# Patient Record
Sex: MALE | Race: WHITE | Employment: UNEMPLOYED | URBAN - METROPOLITAN AREA
[De-identification: names, ages, dates, MRNs, and addresses within clinical notes are randomized per-mention and may not be internally consistent; named-entity substitution may affect disease eponyms.]

---

## 2018-03-26 ENCOUNTER — OFFICE VISIT (OUTPATIENT)
Dept: FAMILY MEDICINE | Facility: CLINIC | Age: 53
End: 2018-03-26
Payer: MEDICARE

## 2018-03-26 VITALS
BODY MASS INDEX: 20.7 KG/M2 | TEMPERATURE: 98.3 F | DIASTOLIC BLOOD PRESSURE: 74 MMHG | HEART RATE: 96 BPM | WEIGHT: 116.8 LBS | HEIGHT: 63 IN | SYSTOLIC BLOOD PRESSURE: 112 MMHG | OXYGEN SATURATION: 94 % | RESPIRATION RATE: 18 BRPM

## 2018-03-26 DIAGNOSIS — Z12.11 COLON CANCER SCREENING: ICD-10-CM

## 2018-03-26 DIAGNOSIS — E03.9 HYPOTHYROIDISM, UNSPECIFIED TYPE: ICD-10-CM

## 2018-03-26 DIAGNOSIS — Z11.59 NEED FOR HEPATITIS C SCREENING TEST: ICD-10-CM

## 2018-03-26 DIAGNOSIS — E11.9 TYPE 2 DIABETES MELLITUS WITHOUT COMPLICATION, WITHOUT LONG-TERM CURRENT USE OF INSULIN (H): Primary | ICD-10-CM

## 2018-03-26 LAB
ALBUMIN SERPL-MCNC: 3.6 G/DL (ref 3.4–5)
ALP SERPL-CCNC: 68 U/L (ref 40–150)
ALT SERPL W P-5'-P-CCNC: 148 U/L (ref 0–70)
ANION GAP SERPL CALCULATED.3IONS-SCNC: 14 MMOL/L (ref 3–14)
AST SERPL W P-5'-P-CCNC: 67 U/L (ref 0–45)
BILIRUB SERPL-MCNC: 0.6 MG/DL (ref 0.2–1.3)
BUN SERPL-MCNC: 10 MG/DL (ref 7–30)
CALCIUM SERPL-MCNC: 9.3 MG/DL (ref 8.5–10.1)
CHLORIDE SERPL-SCNC: 95 MMOL/L (ref 94–109)
CHOLEST SERPL-MCNC: 298 MG/DL
CO2 SERPL-SCNC: 21 MMOL/L (ref 20–32)
CREAT SERPL-MCNC: 0.62 MG/DL (ref 0.66–1.25)
GFR SERPL CREATININE-BSD FRML MDRD: >90 ML/MIN/1.7M2
GLUCOSE SERPL-MCNC: 395 MG/DL (ref 70–99)
HBA1C MFR BLD: 14.4 % (ref 4.3–6)
HCV AB SERPL QL IA: NONREACTIVE
HDLC SERPL-MCNC: 61 MG/DL
LDLC SERPL CALC-MCNC: 190 MG/DL
NONHDLC SERPL-MCNC: 237 MG/DL
POTASSIUM SERPL-SCNC: 4.3 MMOL/L (ref 3.4–5.3)
PROT SERPL-MCNC: 6.9 G/DL (ref 6.8–8.8)
SODIUM SERPL-SCNC: 130 MMOL/L (ref 133–144)
T4 FREE SERPL-MCNC: 0.52 NG/DL (ref 0.76–1.46)
TRIGL SERPL-MCNC: 236 MG/DL
TSH SERPL DL<=0.005 MIU/L-ACNC: 91.17 MU/L (ref 0.4–4)

## 2018-03-26 PROCEDURE — 84439 ASSAY OF FREE THYROXINE: CPT | Performed by: FAMILY MEDICINE

## 2018-03-26 PROCEDURE — 83036 HEMOGLOBIN GLYCOSYLATED A1C: CPT | Performed by: FAMILY MEDICINE

## 2018-03-26 PROCEDURE — 99204 OFFICE O/P NEW MOD 45 MIN: CPT | Performed by: FAMILY MEDICINE

## 2018-03-26 PROCEDURE — G0472 HEP C SCREEN HIGH RISK/OTHER: HCPCS | Performed by: FAMILY MEDICINE

## 2018-03-26 PROCEDURE — 80053 COMPREHEN METABOLIC PANEL: CPT | Performed by: FAMILY MEDICINE

## 2018-03-26 PROCEDURE — 80061 LIPID PANEL: CPT | Performed by: FAMILY MEDICINE

## 2018-03-26 PROCEDURE — 36415 COLL VENOUS BLD VENIPUNCTURE: CPT | Performed by: FAMILY MEDICINE

## 2018-03-26 PROCEDURE — 99207 C FOOT EXAM  NO CHARGE: CPT | Performed by: FAMILY MEDICINE

## 2018-03-26 PROCEDURE — 84443 ASSAY THYROID STIM HORMONE: CPT | Performed by: FAMILY MEDICINE

## 2018-03-26 RX ORDER — GLIPIZIDE 10 MG/1
TABLET, FILM COATED, EXTENDED RELEASE ORAL
Refills: 0 | COMMUNITY
Start: 2017-10-09 | End: 2018-03-26

## 2018-03-26 RX ORDER — GABAPENTIN 400 MG/1
CAPSULE ORAL
Refills: 5 | COMMUNITY
Start: 2018-02-12

## 2018-03-26 RX ORDER — LEVOTHYROXINE SODIUM 200 UG/1
200 TABLET ORAL DAILY
Qty: 90 TABLET | Refills: 3 | Status: SHIPPED | OUTPATIENT
Start: 2018-03-26 | End: 2019-03-18 | Stop reason: DRUGHIGH

## 2018-03-26 RX ORDER — ARIPIPRAZOLE 10 MG/1
10 TABLET ORAL DAILY
Refills: 1 | COMMUNITY
Start: 2018-02-12

## 2018-03-26 RX ORDER — GLIPIZIDE 10 MG/1
10 TABLET, FILM COATED, EXTENDED RELEASE ORAL DAILY
Qty: 90 TABLET | Refills: 3 | Status: SHIPPED | OUTPATIENT
Start: 2018-03-26 | End: 2018-08-21

## 2018-03-26 NOTE — PROGRESS NOTES
"  SUBJECTIVE:   Corey Hooper is a 52 year old male who presents to clinic today for the following health issues:        Diabetes       Patient is checking blood sugars: rarely.  Results range from 210 to    Diabetic concerns: None and blood sugar frequently over 200     Symptoms of hypoglycemia (low blood sugar): none     Paresthesias (numbness or burning in feet) or sores: No     Date of last diabetic eye exam: 3 months     BP Readings from Last 2 Encounters:   No data found for BP     No results found for: A1C, LDL    Amount of exercise or physical activity: 2-3 days/week for an average of 30-45 minutes    Problems taking medications regularly: No    Medication side effects: none    Diet: regular (no restrictions) and diabetic      He sees a psychiatrist that prescribes levothyroxine. Had been seeing Dr Jaeger at [place].    Hasn't seen Dr Jaeger in about a month or so.    Blood sugars are in the 200's.    He takes metformin and glipizide but ran out of it a couple of weeks ago.    Jogs 2 times a week. Works 5 nights a week.    Mother present at appointment with him    Lives alone.      ROS  No blurred vision  No headache  No nausea  Appetite is OK  Getting over the flu, minimal cough  No urinary frequency  Not excessively hungry or thirsty, mom does note he drinks a lot of fluids - but he doesn't agree.  No fever  No numbness of feet  No depression    EXAM:  /74  Pulse 96  Temp 98.3  F (36.8  C) (Oral)  Resp 18  Ht 5' 2.5\" (1.588 m)  Wt 116 lb 12.8 oz (53 kg)  SpO2 94%  BMI 21.02 kg/m2  Constitutional: Healthy, alert, no distress   Cardiovascular: RRR. No murmurs   Respiratory: Clear to auscultation   Gastrointestinal: Bowel sounds active, no masses, rebound, guarding or organomegally   Diabetes foot exam: 5 point monofilament foot exam for sensation intact in both feet.   Skin: no rash appreciated on feet or face or arms  Psych: mood fair, a bit withdrawn.    ASSESSMENT    ICD-10-CM    1. Type 2 " diabetes mellitus without complication, without long-term current use of insulin (H) E11.9 glipiZIDE (GLUCOTROL XL) 10 MG 24 hr tablet     metFORMIN (GLUCOPHAGE) 500 MG tablet     Lipid panel reflex to direct LDL Fasting     Hemoglobin A1c     Comprehensive metabolic panel (BMP + Alb, Alk Phos, ALT, AST, Total. Bili, TP)     FOOT EXAM     T4 free   2. Hypothyroidism, unspecified type E03.9 levothyroxine (SYNTHROID/LEVOTHROID) 200 MCG tablet     TSH with free T4 reflex   3. Need for hepatitis C screening test Z11.59 Hepatitis C antibody   4. Colon cancer screening Z12.11 Fecal colorectal cancer screen (FIT)      Plan:  Patient Instructions   Follow-up in 3 months for diabetes.  Call if you need supplies for testing BG.  Contact me sooner if you have questions.     Addendum: based on today' BG result - patient should retest his BG once daily after restarting his diabetes medications.    Should bring in meter within 1-2 weeks to review the results.    Giles Esparza MD  Family Medicine Physician

## 2018-03-26 NOTE — LETTER
March 27, 2018      Corey Hooper  [address] [place] [id number]        Dear ,    We are writing to inform you of your test results.    I am writing to share your lab results with you from your recent visit in our office. They indicate you have significant room for improvement.    You should check your blood sugar daily and schedule a visit to see me in 1-2 weeks to go over your results after restarting your medications.      Please find the results below and let me know if you have any questions.    Resulted Orders   Lipid panel reflex to direct LDL Fasting   Result Value Ref Range    Cholesterol 298 (H) <200 mg/dL      Comment:      Desirable:       <200 mg/dl    Triglycerides 236 (H) <150 mg/dL      Comment:      Borderline high:  150-199 mg/dl  High:             200-499 mg/dl  Very high:       >499 mg/dl  Non Fasting      HDL Cholesterol 61 >39 mg/dL    LDL Cholesterol Calculated 190 (H) <100 mg/dL      Comment:      Above desirable:  100-129 mg/dl  Borderline High:  130-159 mg/dL  High:             160-189 mg/dL  Very high:       >189 mg/dl      Non HDL Cholesterol 237 (H) <130 mg/dL      Comment:      Above Desirable:  130-159 mg/dl  Borderline high:  160-189 mg/dl  High:             190-219 mg/dl  Very high:       >219 mg/dl     Hemoglobin A1c   Result Value Ref Range    Hemoglobin A1C 14.4 (H) 4.3 - 6.0 %      Comment:      Results confirmed by repeat test   TSH with free T4 reflex   Result Value Ref Range    TSH 91.17 (H) 0.40 - 4.00 mU/L      Comment:      Results confirmed by repeat test   Comprehensive metabolic panel (BMP + Alb, Alk Phos, ALT, AST, Total. Bili, TP)   Result Value Ref Range    Sodium 130 (L) 133 - 144 mmol/L    Potassium 4.3 3.4 - 5.3 mmol/L    Chloride 95 94 - 109 mmol/L    Carbon Dioxide 21 20 - 32 mmol/L    Anion Gap 14 3 - 14 mmol/L    Glucose 395 (H) 70 - 99 mg/dL      Comment:      Non Fasting    Urea Nitrogen 10 7 - 30 mg/dL    Creatinine 0.62 (L) 0.66  - 1.25 mg/dL    GFR Estimate >90 >60 mL/min/1.7m2      Comment:      Non  GFR Calc    GFR Estimate If Black >90 >60 mL/min/1.7m2      Comment:       GFR Calc    Calcium 9.3 8.5 - 10.1 mg/dL    Bilirubin Total 0.6 0.2 - 1.3 mg/dL    Albumin 3.6 3.4 - 5.0 g/dL    Protein Total 6.9 6.8 - 8.8 g/dL    Alkaline Phosphatase 68 40 - 150 U/L     (H) 0 - 70 U/L    AST 67 (H) 0 - 45 U/L   T4 free   Result Value Ref Range    T4 Free 0.52 (L) 0.76 - 1.46 ng/dL     If you have any questions or concerns, please call the clinic at the number listed above.     Sincerely,  Giles Esparza MD/nr

## 2018-03-26 NOTE — MR AVS SNAPSHOT
After Visit Summary   3/26/2018    Corey Hooper    MRN: [id number]           Patient Information     Date Of Birth          1965        Visit Information        Provider Department      3/26/2018 11:45 AM Giles Hopkins MD [facility]        Today's Diagnoses     Type 2 diabetes mellitus without complication, without long-term current use of insulin (H)    -  1    Hypothyroidism, unspecified type        Need for hepatitis C screening test        Colon cancer screening          Care Instructions    Follow-up in 3 months for diabetes.  Call if you need supplies for testing BG.  Contact me sooner if you have questions.          Follow-ups after your visit        Follow-up notes from your care team     Return in about 3 months (around 6/26/2018).      Your next 10 appointments already scheduled     Jun 25, 2018 11:00 AM CDT   Office Visit with Giles Esparza MD   [facility] ([facility])    [address] [place] [id number] [phone]           Bring a current list of meds and any records pertaining to this visit. For Physicals, please bring immunization records and any forms needing to be filled out. Please arrive 10 minutes early to complete paperwork.              Future tests that were ordered for you today     Open Future Orders        Priority Expected Expires Ordered    Fecal colorectal cancer screen (FIT) Routine 4/16/2018 6/18/2018 3/26/2018            Who to contact     If you have questions or need follow up information about today's clinic visit or your schedule please contact [facility] directly at [phone].  Normal or non-critical lab and imaging results will be communicated to you by MyChart, letter or phone within 4 business days after the clinic has received the results. If you do not hear from us within 7 days, please contact the clinic through "[employer]t or  "phone. If you have a critical or abnormal lab result, we will notify you by phone as soon as possible.  Submit refill requests through [employer] or call your pharmacy and they will forward the refill request to us. Please allow 3 business days for your refill to be completed.          Additional Information About Your Visit        [employer] Information     [employer] lets you send messages to your doctor, view your test results, renew your prescriptions, schedule appointments and more. To sign up, go to [email or web address] . Click on \"Log in\" on the left side of the screen, which will take you to the Welcome page. Then click on \"Sign up Now\" on the right side of the page.     You will be asked to enter the access code listed below, as well as some personal information. Please follow the directions to create your username and password.     Your access code is: [email or web address]  Expires: 2018  4:55 PM     Your access code will  in 90 days. If you need help or a new code, please call your [facility] clinic or [phone].        Care EveryWhere ID     This is your Care EveryWhere ID. This could be used by other organizations to access your [facility] medical records  [id number]        Your Vitals Were     Pulse Temperature Respirations Height Pulse Oximetry BMI (Body Mass Index)    96 98.3  F (36.8  C) (Oral) 18 5' 2.5\" (1.588 m) 94% 21.02 kg/m2       Blood Pressure from Last 3 Encounters:   18 112/74    Weight from Last 3 Encounters:   18 116 lb 12.8 oz (53 kg)              We Performed the Following     Comprehensive metabolic panel (BMP + Alb, Alk Phos, ALT, AST, Total. Bili, TP)     FOOT EXAM     Hemoglobin A1c     Hepatitis C antibody     Lipid panel reflex to direct LDL Fasting     T4 free     TSH with free T4 reflex          Today's Medication Changes          These changes are accurate as of 3/26/18  4:55 PM.  If you have any questions, ask your nurse or doctor.               These medicines have " changed or have updated prescriptions.        Dose/Directions    glipiZIDE 10 MG 24 hr tablet   Commonly known as:  GLUCOTROL XL   This may have changed:  See the new instructions.   Used for:  Type 2 diabetes mellitus without complication, without long-term current use of insulin (H)   Changed by:  Giles Hopkins MD        Dose:  10 mg   Take 1 tablet (10 mg) by mouth daily   Quantity:  90 tablet   Refills:  3       * LEVOTHYROXINE SODIUM PO   This may have changed:  Another medication with the same name was added. Make sure you understand how and when to take each.   Changed by:  Giles Hopkins MD        Refills:  0       * levothyroxine 200 MCG tablet   Commonly known as:  SYNTHROID/LEVOTHROID   This may have changed:  You were already taking a medication with the same name, and this prescription was added. Make sure you understand how and when to take each.   Used for:  Hypothyroidism, unspecified type   Changed by:  Giles Hopkins MD        Dose:  200 mcg   Take 1 tablet (200 mcg) by mouth daily   Quantity:  90 tablet   Refills:  3       metFORMIN 500 MG tablet   Commonly known as:  GLUCOPHAGE   This may have changed:  See the new instructions.   Used for:  Type 2 diabetes mellitus without complication, without long-term current use of insulin (H)   Changed by:  Giles Hopkins MD        Dose:  500 mg   Take 1 tablet (500 mg) by mouth 2 times daily (with meals)   Quantity:  180 tablet   Refills:  3       * Notice:  This list has 2 medication(s) that are the same as other medications prescribed for you. Read the directions carefully, and ask your doctor or other care provider to review them with you.         Where to get your medicines      These medications were sent to [facility] - [place], [place] - [address]  [address], [place] [address]     Phone:  [phone]     glipiZIDE 10 MG 24 hr tablet    levothyroxine 200  MCG tablet    metFORMIN 500 MG tablet                Primary Care Provider Fax #    Physician No Ref-Primary [phone]       No address on file        Equal Access to Services     TANI GEE : Hadii aad ku hadkira Keita, cora fernandez, svitlana gilbert, giovany lawrence So [facility] [phone].    ATENCIÓN: Si habla español, tiene a valladares disposición servicios gratuitos de asistencia lingüística. Llame al [phone].    We comply with applicable federal civil rights laws and [place] laws. We do not discriminate on the basis of race, color, national origin, age, disability, sex, sexual orientation, or gender identity.            Thank you!     Thank you for choosing [facility]  for your care. Our goal is always to provide you with excellent care. Hearing back from our patients is one way we can continue to improve our services. Please take a few minutes to complete the written survey that you may receive in the mail after your visit with us. Thank you!             Your Updated Medication List - Protect others around you: Learn how to safely use, store and throw away your medicines at www.disposemymeds.org.          This list is accurate as of 3/26/18  4:55 PM.  Always use your most recent med list.                   Brand Name Dispense Instructions for use Diagnosis    ARIPiprazole 10 MG tablet    ABILIFY          gabapentin 400 MG capsule    NEURONTIN     TK 2 CS PO D        glipiZIDE 10 MG 24 hr tablet    GLUCOTROL XL    90 tablet    Take 1 tablet (10 mg) by mouth daily    Type 2 diabetes mellitus without complication, without long-term current use of insulin (H)       * LEVOTHYROXINE SODIUM PO           * levothyroxine 200 MCG tablet    SYNTHROID/LEVOTHROID    90 tablet    Take 1 tablet (200 mcg) by mouth daily    Hypothyroidism, unspecified type       metFORMIN 500 MG tablet    GLUCOPHAGE    180 tablet    Take 1 tablet (500 mg) by mouth 2 times daily  (with meals)    Type 2 diabetes mellitus without complication, without long-term current use of insulin (H)       * Notice:  This list has 2 medication(s) that are the same as other medications prescribed for you. Read the directions carefully, and ask your doctor or other care provider to review them with you.

## 2018-03-26 NOTE — PATIENT INSTRUCTIONS
Follow-up in 3 months for diabetes.  Call if you need supplies for testing BG.  Contact me sooner if you have questions.

## 2018-05-05 PROCEDURE — 82274 ASSAY TEST FOR BLOOD FECAL: CPT | Performed by: FAMILY MEDICINE

## 2018-05-08 DIAGNOSIS — Z12.11 COLON CANCER SCREENING: ICD-10-CM

## 2018-05-08 LAB — HEMOCCULT STL QL IA: NEGATIVE

## 2018-06-25 ENCOUNTER — OFFICE VISIT (OUTPATIENT)
Dept: FAMILY MEDICINE | Facility: CLINIC | Age: 53
End: 2018-06-25
Payer: MEDICARE

## 2018-06-25 VITALS
BODY MASS INDEX: 21.71 KG/M2 | DIASTOLIC BLOOD PRESSURE: 79 MMHG | HEART RATE: 99 BPM | RESPIRATION RATE: 16 BRPM | TEMPERATURE: 97.4 F | SYSTOLIC BLOOD PRESSURE: 114 MMHG | WEIGHT: 120.6 LBS

## 2018-06-25 DIAGNOSIS — E11.9 TYPE 2 DIABETES MELLITUS WITHOUT COMPLICATION, WITHOUT LONG-TERM CURRENT USE OF INSULIN (H): Primary | ICD-10-CM

## 2018-06-25 DIAGNOSIS — E03.9 HYPOTHYROIDISM, UNSPECIFIED TYPE: ICD-10-CM

## 2018-06-25 LAB
ALBUMIN SERPL-MCNC: 4.2 G/DL (ref 3.4–5)
ALP SERPL-CCNC: 38 U/L (ref 40–150)
ALT SERPL W P-5'-P-CCNC: 35 U/L (ref 0–70)
ANION GAP SERPL CALCULATED.3IONS-SCNC: 10 MMOL/L (ref 3–14)
AST SERPL W P-5'-P-CCNC: 21 U/L (ref 0–45)
BILIRUB SERPL-MCNC: 0.8 MG/DL (ref 0.2–1.3)
BUN SERPL-MCNC: 14 MG/DL (ref 7–30)
CALCIUM SERPL-MCNC: 9.4 MG/DL (ref 8.5–10.1)
CHLORIDE SERPL-SCNC: 97 MMOL/L (ref 94–109)
CHOLEST SERPL-MCNC: 240 MG/DL
CO2 SERPL-SCNC: 26 MMOL/L (ref 20–32)
CREAT SERPL-MCNC: 0.65 MG/DL (ref 0.66–1.25)
GFR SERPL CREATININE-BSD FRML MDRD: >90 ML/MIN/1.7M2
GLUCOSE SERPL-MCNC: 325 MG/DL (ref 70–99)
HBA1C MFR BLD: 12.7 % (ref 0–5.6)
HDLC SERPL-MCNC: 103 MG/DL
LDLC SERPL CALC-MCNC: 127 MG/DL
NONHDLC SERPL-MCNC: 137 MG/DL
POTASSIUM SERPL-SCNC: 4.5 MMOL/L (ref 3.4–5.3)
PROT SERPL-MCNC: 7.4 G/DL (ref 6.8–8.8)
SODIUM SERPL-SCNC: 133 MMOL/L (ref 133–144)
TRIGL SERPL-MCNC: 50 MG/DL
TSH SERPL DL<=0.005 MIU/L-ACNC: 0.91 MU/L (ref 0.4–4)

## 2018-06-25 PROCEDURE — 36415 COLL VENOUS BLD VENIPUNCTURE: CPT | Performed by: FAMILY MEDICINE

## 2018-06-25 PROCEDURE — 80053 COMPREHEN METABOLIC PANEL: CPT | Performed by: FAMILY MEDICINE

## 2018-06-25 PROCEDURE — 83036 HEMOGLOBIN GLYCOSYLATED A1C: CPT | Performed by: FAMILY MEDICINE

## 2018-06-25 PROCEDURE — 84443 ASSAY THYROID STIM HORMONE: CPT | Performed by: FAMILY MEDICINE

## 2018-06-25 PROCEDURE — 80061 LIPID PANEL: CPT | Performed by: FAMILY MEDICINE

## 2018-06-25 PROCEDURE — 99214 OFFICE O/P EST MOD 30 MIN: CPT | Performed by: FAMILY MEDICINE

## 2018-06-25 RX ORDER — GLIPIZIDE 10 MG/1
20 TABLET, FILM COATED, EXTENDED RELEASE ORAL DAILY
Qty: 180 TABLET | Refills: 1 | Status: SHIPPED | OUTPATIENT
Start: 2018-06-25 | End: 2018-10-05

## 2018-06-25 RX ORDER — METFORMIN HCL 500 MG
1000 TABLET, EXTENDED RELEASE 24 HR ORAL 2 TIMES DAILY WITH MEALS
Qty: 360 TABLET | Refills: 1 | Status: SHIPPED | OUTPATIENT
Start: 2018-06-25 | End: 2018-10-05

## 2018-06-25 NOTE — MR AVS SNAPSHOT
After Visit Summary   6/25/2018    Corey Hooper    MRN: [id number]           Patient Information     Date Of Birth          1965        Visit Information        Provider Department      6/25/2018 11:00 AM Giles Hopkins MD [facility]        Today's Diagnoses     Type 2 diabetes mellitus without complication, without long-term current use of insulin (H)    -  1      Care Instructions    Follow-up in 3 months for lab recheck.    Increase metformin to 1,000mg twice daily and glipizide to 20mg daily.    Will check liver function and cholesterol tests today.    Will likely start simvastatin 40mg unless LDL is very low (below 40).          Follow-ups after your visit        Who to contact     If you have questions or need follow up information about today's clinic visit or your schedule please contact [facility] directly at [phone].  Normal or non-critical lab and imaging results will be communicated to you by MyChart, letter or phone within 4 business days after the clinic has received the results. If you do not hear from us within 7 days, please contact the clinic through "[employer]hart or phone. If you have a critical or abnormal lab result, we will notify you by phone as soon as possible.  Submit refill requests through [employer] or call your pharmacy and they will forward the refill request to us. Please allow 3 business days for your refill to be completed.          Additional Information About Your Visit        MyChart Information     [employer] gives you secure access to your electronic health record. If you see a primary care provider, you can also send messages to your care team and make appointments. If you have questions, please call your primary care clinic.  If you do not have a primary care provider, please call [phone] and they will assist you.        Care EveryWhere ID     This is your Care EveryWhere ID. This could be used by other  organizations to access your [facility] medical records  [id number]        Your Vitals Were     Pulse Temperature Respirations BMI (Body Mass Index)          99 97.4  F (36.3  C) (Oral) 16 21.71 kg/m2         Blood Pressure from Last 3 Encounters:   06/25/18 114/79   03/26/18 112/74    Weight from Last 3 Encounters:   06/25/18 120 lb 9.6 oz (54.7 kg)   03/26/18 116 lb 12.8 oz (53 kg)              We Performed the Following     Comprehensive metabolic panel (BMP + Alb, Alk Phos, ALT, AST, Total. Bili, TP)     Hemoglobin A1c     Lipid panel reflex to direct LDL Fasting          Today's Medication Changes          These changes are accurate as of 6/25/18 11:29 AM.  If you have any questions, ask your nurse or doctor.               These medicines have changed or have updated prescriptions.        Dose/Directions    * glipiZIDE 10 MG 24 hr tablet   Commonly known as:  GLUCOTROL XL   This may have changed:  Another medication with the same name was added. Make sure you understand how and when to take each.   Used for:  Type 2 diabetes mellitus without complication, without long-term current use of insulin (H)   Changed by:  Giles Hopkins MD        Dose:  10 mg   Take 1 tablet (10 mg) by mouth daily   Quantity:  90 tablet   Refills:  3       * glipiZIDE 10 MG 24 hr tablet   Commonly known as:  glipiZIDE XL   This may have changed:  You were already taking a medication with the same name, and this prescription was added. Make sure you understand how and when to take each.   Used for:  Type 2 diabetes mellitus without complication, without long-term current use of insulin (H)   Changed by:  Giles Hopkins MD        Dose:  20 mg   Take 2 tablets (20 mg) by mouth daily   Quantity:  180 tablet   Refills:  1       * metFORMIN 500 MG tablet   Commonly known as:  GLUCOPHAGE   This may have changed:  Another medication with the same name was added. Make sure you understand how and when to take  each.   Used for:  Type 2 diabetes mellitus without complication, without long-term current use of insulin (H)   Changed by:  Giles Hopkins MD        Dose:  500 mg   Take 1 tablet (500 mg) by mouth 2 times daily (with meals)   Quantity:  180 tablet   Refills:  3       * metFORMIN 500 MG 24 hr tablet   Commonly known as:  GLUCOPHAGE-XR   This may have changed:  You were already taking a medication with the same name, and this prescription was added. Make sure you understand how and when to take each.   Used for:  Type 2 diabetes mellitus without complication, without long-term current use of insulin (H)   Changed by:  Giles Hopkins MD        Dose:  1000 mg   Take 2 tablets (1,000 mg) by mouth 2 times daily (with meals)   Quantity:  360 tablet   Refills:  1       * Notice:  This list has 4 medication(s) that are the same as other medications prescribed for you. Read the directions carefully, and ask your doctor or other care provider to review them with you.         Where to get your medicines      These medications were sent to [facility] - [place], [place] - [address]  [address], [place] [address]     Phone:  [phone]     glipiZIDE 10 MG 24 hr tablet    metFORMIN 500 MG 24 hr tablet                Primary Care Provider Fax #    Physician No Ref-Primary [phone]       No address on file        Equal Access to Services     TANI GEE : Toi crow Soralph, waaxda luqadaha, qaybta kaalmada ofelia, giovany gomez. So [facility] [place] [phone].    ATENCIÓN: Si habla español, tiene a valladares disposición servicios gratuitos de asistencia lingüística. Llame al [phone].    We comply with applicable federal civil rights laws and [place] laws. We do not discriminate on the basis of race, color, national origin, age, disability, sex, sexual orientation, or gender identity.            Thank you!     Thank you for choosing  [facility]  for your care. Our goal is always to provide you with excellent care. Hearing back from our patients is one way we can continue to improve our services. Please take a few minutes to complete the written survey that you may receive in the mail after your visit with us. Thank you!             Your Updated Medication List - Protect others around you: Learn how to safely use, store and throw away your medicines at www.disposemymeds.org.          This list is accurate as of 6/25/18 11:29 AM.  Always use your most recent med list.                   Brand Name Dispense Instructions for use Diagnosis    ARIPiprazole 10 MG tablet    ABILIFY          gabapentin 400 MG capsule    NEURONTIN     TK 2 CS PO D        * glipiZIDE 10 MG 24 hr tablet    GLUCOTROL XL    90 tablet    Take 1 tablet (10 mg) by mouth daily    Type 2 diabetes mellitus without complication, without long-term current use of insulin (H)       * glipiZIDE 10 MG 24 hr tablet    glipiZIDE XL    180 tablet    Take 2 tablets (20 mg) by mouth daily    Type 2 diabetes mellitus without complication, without long-term current use of insulin (H)       * LEVOTHYROXINE SODIUM PO           * levothyroxine 200 MCG tablet    SYNTHROID/LEVOTHROID    90 tablet    Take 1 tablet (200 mcg) by mouth daily    Hypothyroidism, unspecified type       * metFORMIN 500 MG tablet    GLUCOPHAGE    180 tablet    Take 1 tablet (500 mg) by mouth 2 times daily (with meals)    Type 2 diabetes mellitus without complication, without long-term current use of insulin (H)       * metFORMIN 500 MG 24 hr tablet    GLUCOPHAGE-XR    360 tablet    Take 2 tablets (1,000 mg) by mouth 2 times daily (with meals)    Type 2 diabetes mellitus without complication, without long-term current use of insulin (H)       * Notice:  This list has 6 medication(s) that are the same as other medications prescribed for you. Read the directions carefully, and ask your doctor or other care  provider to review them with you.

## 2018-06-25 NOTE — PATIENT INSTRUCTIONS
Follow-up in 3 months for lab recheck.    Increase metformin to 1,000mg twice daily and glipizide to 20mg daily.    Will check liver function and cholesterol tests today.    Will likely start simvastatin 40mg unless LDL is very low (below 40).

## 2018-06-25 NOTE — PROGRESS NOTES
Here to check on diabetes and cholesterol    He checks his BG and gets 210 or a little under.    Taking and tolerating medications for diabetes and thyroid replacement.    ROS  No fever  No dizziness  Does have some constipation  Mild nausea intermittently    EXAM:  /79  Pulse 99  Temp 97.4  F (36.3  C) (Oral)  Resp 16  Wt 120 lb 9.6 oz (54.7 kg)  BMI 21.71 kg/m2  Constitutional: Healthy, alert, no distress   Cardiovascular: RRR. No murmurs   Respiratory: Clear to auscultation     ASSESSMENT    ICD-10-CM    1. Type 2 diabetes mellitus without complication, without long-term current use of insulin (H) E11.9 Hemoglobin A1c     metFORMIN (GLUCOPHAGE-XR) 500 MG 24 hr tablet     glipiZIDE (GLIPIZIDE XL) 10 MG 24 hr tablet     Comprehensive metabolic panel (BMP + Alb, Alk Phos, ALT, AST, Total. Bili, TP)     Lipid panel reflex to direct LDL Fasting     CANCELED: Hemoglobin A1c   2. Hypothyroidism, unspecified type E03.9 TSH with free T4 reflex      Plan:  Patient Instructions   Follow-up in 3 months for lab recheck.    Increase metformin to 1,000mg twice daily and glipizide to 20mg daily.    Will check liver function and cholesterol tests today.    Will likely start simvastatin 40mg unless LDL is very low (below 40).     Diabetes poorly controlled, recommending change to medications and close follow-up  Thyroid replacement appears unsatisfactory by test, may relate to poor diabetes management, will retest.      Giles Esparza MD  Family Medicine Physician

## 2018-07-27 DIAGNOSIS — E78.5 HYPERLIPIDEMIA LDL GOAL <70: Primary | ICD-10-CM

## 2018-07-27 RX ORDER — SIMVASTATIN 40 MG
40 TABLET ORAL AT BEDTIME
Qty: 90 TABLET | Refills: 3 | Status: SHIPPED | OUTPATIENT
Start: 2018-07-27 | End: 2019-03-11

## 2018-07-27 NOTE — TELEPHONE ENCOUNTER
Spoke with patient, encouraged him to start this, but he wants to consider it before doing so. Script sent, if he hasn't started it by next visit can discuss with him at that time.    Will check CMP after starting statin in 3-6 months.    Giles Esparza MD  Family Medicine Physician

## 2018-07-27 NOTE — TELEPHONE ENCOUNTER
Mother Sharon calling and Corey does wants to start the simvastatin, please sign off on med if correct.

## 2018-07-30 ENCOUNTER — TELEPHONE (OUTPATIENT)
Dept: FAMILY MEDICINE | Facility: CLINIC | Age: 53
End: 2018-07-30

## 2018-07-30 NOTE — TELEPHONE ENCOUNTER
Roxie Hunter, mother, stated that she has previously spoken to triage nurse and had his office visit reports ready at clinic  to .  Is now requesting to  all his lab work that was done on 3-26-18 and 6-25-18.  I was going to transfer her to Medical Records and she said the nurse will get them ready for her to .  Please call. OK to leave message on voicemail.  **Consent to communicate with mother is on file. **

## 2018-08-13 ENCOUNTER — TELEPHONE (OUTPATIENT)
Dept: FAMILY MEDICINE | Facility: CLINIC | Age: 53
End: 2018-08-13

## 2018-08-13 DIAGNOSIS — E11.9 TYPE 2 DIABETES MELLITUS WITHOUT COMPLICATION, WITHOUT LONG-TERM CURRENT USE OF INSULIN (H): Primary | ICD-10-CM

## 2018-08-13 NOTE — TELEPHONE ENCOUNTER
Reason for Call: Request for an order or referral:    Order or referral being requested: Nutritionist     Date needed: as soon as possible    Has the patient been seen by the PCP for this problem? YES    Additional comments: Pt is requesting for a referral, states this was discussed during the last OV. Please advise, thank you!    Phone number Patient can be reached at:  Home number on file [phone] (home)    Best Time:  Anytime    Can we leave a detailed message on this number?  YES    Call taken on 8/13/2018 at 1:26 PM by Courtney Taylor

## 2018-08-21 ENCOUNTER — OFFICE VISIT (OUTPATIENT)
Dept: PHARMACY | Facility: CLINIC | Age: 53
End: 2018-08-21
Payer: COMMERCIAL

## 2018-08-21 VITALS
DIASTOLIC BLOOD PRESSURE: 80 MMHG | SYSTOLIC BLOOD PRESSURE: 108 MMHG | HEART RATE: 95 BPM | OXYGEN SATURATION: 95 % | WEIGHT: 114.2 LBS | BODY MASS INDEX: 20.55 KG/M2

## 2018-08-21 DIAGNOSIS — E11.9 TYPE 2 DIABETES MELLITUS WITHOUT COMPLICATION, WITHOUT LONG-TERM CURRENT USE OF INSULIN (H): Primary | ICD-10-CM

## 2018-08-21 DIAGNOSIS — F25.9 SCHIZOPHRENIA, SCHIZO-AFFECTIVE (H): ICD-10-CM

## 2018-08-21 PROCEDURE — 99607 MTMS BY PHARM ADDL 15 MIN: CPT | Mod: GY | Performed by: PHARMACIST

## 2018-08-21 PROCEDURE — 99605 MTMS BY PHARM NP 15 MIN: CPT | Mod: GY | Performed by: PHARMACIST

## 2018-08-21 NOTE — PROGRESS NOTES
SUBJECTIVE/OBJECTIVE:                           Corey Hooper is a 52 year old male coming in for an initial visit for Medication Therapy Management.  He was referred to me from Dr. Duane Monroe.      FYI--Use GY modifier when billing.      Chief Complaint:  Here for help with meds/ trying to gain weight .   He is not interested in insulin but wants food plans.         Personal Healthcare Goals: fix dm w/out insulin, gain 20+ lbs.     Allergies/ADRs: Reviewed in Epic  Tobacco: No tobacco use  Alcohol: not currently using  Caffeine: 1-2 sodas/day, 1-2 coffee's/day .   Activity: belongs to gym - goes 2-3 x month .   PMH: Reviewed in Epic    Medication Adherence/Access:  no issues reported    Diabetes:  Pt currently taking metformin er 500mg --2 tabs bid , glipizide 2 x10mgs qam.. Pt is not experiencing side effects.  SMBG: one time daily, two times daily.   Ranges (patient reported): fasting in am - 200+, 2 hrs post supper - 200+   Patient is not experiencing hypoglycemia  Recent symptoms of high blood sugar? Anxious, depressed, lethargic  Eye exam: due  Foot exam: up to date  ACEi/ARB: No.   Urine Albumin: No results found for: UMALCR   Aspirin: Not taking due to ?   Diet/Exercise: b-fast - frosted flakes and soy milk, snacks -apples, potato chips, lunch - skips --, dinner- eats frozen meals a lot , popcorn, drinks - pop        Schizo-affective disorder: Taking abilify 10mg./day --stable mental health wise --we discussed this drug can increase blood sugars but more important that he keep taking it for mental health help.       Today's Vitals: /80  Pulse 95  Wt 114 lb 3.2 oz (51.8 kg)  SpO2 95%  BMI 20.55 kg/m2      ASSESSMENT:                             Current medications were reviewed today.     Medication Adherence: excellent, no issues identified    Diabetes: Needs Improvement. Patient is not meeting A1c goal of < 7%--FYI--He declines insulin shots today!  . Self monitoring of blood glucose is not  at goal of fasting  mg/dL and post prandial < 150 mg/dL. Pt would benefit from minimum SMBG: Check blood sugars fasting, and occasionally 2 hours after starting a meal.  Metformin :  stay on the same dose.  SFU (glipizide) :  stay on the same dose.  Increased exercise  Weight gain recommended--gave my 1 page food/diet handout --spent majority of visit educating patient that he needs to REDUCE carbs/sugars in diet to store bs and gain weight , also we googled american diabetes websiet for meal plans so he know /has ideas of what foods to cook/eat .   Aspirin therapy is safe and appropriate. Aspirin therapy is indicated in this patient at dose of 81mg daily. Pt is not taking aspirin. He agreed to start this week.     Schizo-affective disorder:  STABLE     PLAN:                            1.  FYI--Please read my 1 page dietary food handout --remember --a lot of your weight loss is due to elevated (high) blood sugars . Reduce your carbohydrates in diet --eat more proteins/halthy fats and non starchy veggies and drink water.   Stay on your glipizide and metformin as is -- check blood sugar 2 x day .  If blood sugars get to low --we will cut back on your glipizide.   If your blood sugars donot improve --we can add insulin -basal injection once daily.     2. American Diabetes association has meal plans for you --google them or diabetes.org.     3. FYI--Please take a daily 81mg aspirin tab .       Next MTM visit:  Try the diet --make appt. To see me if your interested. Bring blood sugar meter if you come to see me.      I spent 50 minutes with this patient today. All changes were made via collaborative practice agreement with Dr. Bautista. A copy of the visit note was provided to the patient's primary care and referring provider.        The patient was given a summary of these recommendations as an after visit summary.     Tomasa Deal Rph.  Medication Therapy Management Provider  [phone]

## 2018-08-21 NOTE — MR AVS SNAPSHOT
After Visit Summary   8/21/2018    Corey Hooper    MRN: [id number]           Patient Information     Date Of Birth          1965        Visit Information        Provider Department      8/21/2018 2:30 PM Tomasa Deal RPH [facility] MTM        Care Instructions    Recommendations from today's MTM visit:                                                    MTM (medication therapy management) is a service provided by a clinical pharmacist designed to help you get the most of out of your medicines.   Today we reviewed what your medicines are for, how to know if they are working, that your medicines are safe and how to make your medicine regimen as easy as possible.     1.  FYI--Please read my 1 page dietary food handout --remember --a lot of your weight loss is due to elevated (high) blood sugars . Reduce your carbohydrates in diet --eat more proteins/halthy fats and non starchy veggies and drink water.   Stay on your glipizide and metformin as is -- check blood sugar 2 x day .  If blood sugars get to low --we will cut back on your glipizide.   If your blood sugars donot improve --we can add insulin -basal injection once daily.     2. American Diabetes association has meal plans for you --google them or diabetes.org.     3. FYI--Please take a daily 81mg aspirin tab .       Next MTM visit:  Try the diet --make appt. To see me if your interested. Bring blood sugar meter if you come to see me.    To schedule another MTM appointment, please call the clinic directly or you may call the MTM scheduling line at [phone] or toll-free at [phone].     My Clinical Pharmacist's contact information:                                                      It was a pleasure seeing you today!  Please feel free to contact me with any questions or concerns you have.      Tomasa Deal Rph.  Medication Therapy Management Provider  [phone]      You may receive a survey about the MTM  services you received.  I would appreciate your feedback to help me serve you better in the future. Please fill it out and return it when you can. Your comments will be anonymous.                    Follow-ups after your visit        Who to contact     If you have questions or need follow up information about today's clinic visit or your schedule please contact [facility] directly at [phone].  Normal or non-critical lab and imaging results will be communicated to you by MYOShart, letter or phone within 4 business days after the clinic has received the results. If you do not hear from us within 7 days, please contact the clinic through [employer] or phone. If you have a critical or abnormal lab result, we will notify you by phone as soon as possible.  Submit refill requests through [employer] or call your pharmacy and they will forward the refill request to us. Please allow 3 business days for your refill to be completed.          Additional Information About Your Visit        MYOShart Information     [employer] gives you secure access to your electronic health record. If you see a primary care provider, you can also send messages to your care team and make appointments. If you have questions, please call your primary care clinic.  If you do not have a primary care provider, please call [phone] and they will assist you.        Care EveryWhere ID     This is your Care EveryWhere ID. This could be used by other organizations to access your [facility] medical records  [id number]        Your Vitals Were     Pulse Pulse Oximetry BMI (Body Mass Index)             95 95% 20.55 kg/m2          Blood Pressure from Last 3 Encounters:   08/21/18 108/80   06/25/18 114/79   03/26/18 112/74    Weight from Last 3 Encounters:   08/21/18 114 lb 3.2 oz (51.8 kg)   06/25/18 120 lb 9.6 oz (54.7 kg)   03/26/18 116 lb 12.8 oz (53 kg)              Today, you had the following     No orders found for display        Primary Care Provider Fax #    Physician No Ref-Primary [phone]       No address on file        Equal Access to Services     TANI GEE : Hadii aad ku hadkira Keita, cora marilynjhoana, svitlana gilbert, giovany chikisin hayaairene echeverrialara barraza laSantosfausto gomez. So [facility] [phone].    ATENCIÓN: Si habla español, tiene a valladares disposición servicios gratuitos de asistencia lingüística. Llame al [phone].    We comply with applicable federal civil rights laws and [place] laws. We do not discriminate on the basis of race, color, national origin, age, disability, sex, sexual orientation, or gender identity.            Thank you!     Thank you for choosing [facility]  for your care. Our goal is always to provide you with excellent care. Hearing back from our patients is one way we can continue to improve our services. Please take a few minutes to complete the written survey that you may receive in the mail after your visit with us. Thank you!             Your Updated Medication List - Protect others around you: Learn how to safely use, store and throw away your medicines at www.disposemymeds.org.          This list is accurate as of 8/21/18  3:14 PM.  Always use your most recent med list.                   Brand Name Dispense Instructions for use Diagnosis    ARIPiprazole 10 MG tablet    ABILIFY     Take 10 mg by mouth daily        gabapentin 400 MG capsule    NEURONTIN     TK 2 CS PO D        glipiZIDE 10 MG 24 hr tablet    glipiZIDE XL    180 tablet    Take 2 tablets (20 mg) by mouth daily    Type 2 diabetes mellitus without complication, without long-term current use of insulin (H)       levothyroxine 200 MCG tablet    SYNTHROID/LEVOTHROID    90 tablet    Take 1 tablet (200 mcg) by mouth daily    Hypothyroidism, unspecified type       metFORMIN 500 MG 24 hr tablet    GLUCOPHAGE-XR    360 tablet    Take 2 tablets (1,000 mg) by mouth 2 times daily (with meals)    Type 2 diabetes mellitus  without complication, without long-term current use of insulin (H)       simvastatin 40 MG tablet    ZOCOR    90 tablet    Take 1 tablet (40 mg) by mouth At Bedtime    Hyperlipidemia LDL goal <70

## 2018-08-21 NOTE — Clinical Note
Sharan. For mtm referral --educated pt. Why he isnt gaining weight --he declined insulin today --will try low carb diet with current oral dm meds first.  Luis.-orestes

## 2018-08-21 NOTE — PATIENT INSTRUCTIONS
Recommendations from today's MTM visit:                                                    MTM (medication therapy management) is a service provided by a clinical pharmacist designed to help you get the most of out of your medicines.   Today we reviewed what your medicines are for, how to know if they are working, that your medicines are safe and how to make your medicine regimen as easy as possible.     1.  FYI--Please read my 1 page dietary food handout --remember --a lot of your weight loss is due to elevated (high) blood sugars . Reduce your carbohydrates in diet --eat more proteins/halthy fats and non starchy veggies and drink water.   Stay on your glipizide and metformin as is -- check blood sugar 2 x day .  If blood sugars get to low --we will cut back on your glipizide.   If your blood sugars donot improve --we can add insulin -basal injection once daily.     2. American Diabetes association has meal plans for you --google them or diabetes.org.     3. FYI--Please take a daily 81mg aspirin tab .       Next MTM visit:  Try the diet --make appt. To see me if your interested. Bring blood sugar meter if you come to see me.    To schedule another MTM appointment, please call the clinic directly or you may call the MTM scheduling line at [phone] or toll-free at [phone].     My Clinical Pharmacist's contact information:                                                      It was a pleasure seeing you today!  Please feel free to contact me with any questions or concerns you have.      Tomasa Deal [facility].  Medication Therapy Management Provider  [phone]      You may receive a survey about the MTM services you received.  I would appreciate your feedback to help me serve you better in the future. Please fill it out and return it when you can. Your comments will be anonymous.

## 2018-10-03 ENCOUNTER — MYC MEDICAL ADVICE (OUTPATIENT)
Dept: FAMILY MEDICINE | Facility: CLINIC | Age: 53
End: 2018-10-03

## 2018-10-05 ENCOUNTER — OFFICE VISIT (OUTPATIENT)
Dept: FAMILY MEDICINE | Facility: CLINIC | Age: 53
End: 2018-10-05
Payer: MEDICARE

## 2018-10-05 VITALS
WEIGHT: 114 LBS | OXYGEN SATURATION: 98 % | HEIGHT: 63 IN | HEART RATE: 68 BPM | TEMPERATURE: 97.9 F | DIASTOLIC BLOOD PRESSURE: 80 MMHG | RESPIRATION RATE: 14 BRPM | SYSTOLIC BLOOD PRESSURE: 132 MMHG | BODY MASS INDEX: 20.2 KG/M2

## 2018-10-05 DIAGNOSIS — E11.9 TYPE 2 DIABETES MELLITUS WITHOUT COMPLICATION, WITHOUT LONG-TERM CURRENT USE OF INSULIN (H): Primary | ICD-10-CM

## 2018-10-05 DIAGNOSIS — E03.9 HYPOTHYROIDISM, UNSPECIFIED TYPE: ICD-10-CM

## 2018-10-05 DIAGNOSIS — Z23 NEED FOR PROPHYLACTIC VACCINATION AND INOCULATION AGAINST INFLUENZA: ICD-10-CM

## 2018-10-05 LAB
HBA1C MFR BLD: 11.8 % (ref 0–5.6)
T4 FREE SERPL-MCNC: 1.52 NG/DL (ref 0.76–1.46)
TSH SERPL DL<=0.005 MIU/L-ACNC: 28.44 MU/L (ref 0.4–4)

## 2018-10-05 PROCEDURE — 84439 ASSAY OF FREE THYROXINE: CPT | Performed by: FAMILY MEDICINE

## 2018-10-05 PROCEDURE — 36415 COLL VENOUS BLD VENIPUNCTURE: CPT | Performed by: FAMILY MEDICINE

## 2018-10-05 PROCEDURE — G0008 ADMIN INFLUENZA VIRUS VAC: HCPCS | Performed by: FAMILY MEDICINE

## 2018-10-05 PROCEDURE — 99214 OFFICE O/P EST MOD 30 MIN: CPT | Mod: 25 | Performed by: FAMILY MEDICINE

## 2018-10-05 PROCEDURE — 84443 ASSAY THYROID STIM HORMONE: CPT | Performed by: FAMILY MEDICINE

## 2018-10-05 PROCEDURE — 83036 HEMOGLOBIN GLYCOSYLATED A1C: CPT | Performed by: FAMILY MEDICINE

## 2018-10-05 PROCEDURE — 90682 RIV4 VACC RECOMBINANT DNA IM: CPT | Performed by: FAMILY MEDICINE

## 2018-10-05 RX ORDER — GLIPIZIDE 10 MG/1
20 TABLET, FILM COATED, EXTENDED RELEASE ORAL DAILY
Qty: 180 TABLET | Refills: 0 | Status: SHIPPED | OUTPATIENT
Start: 2018-10-05 | End: 2019-02-17

## 2018-10-05 RX ORDER — LEVOTHYROXINE SODIUM 175 UG/1
175 TABLET ORAL DAILY
Qty: 30 TABLET | Refills: 1 | Status: SHIPPED | OUTPATIENT
Start: 2018-10-05 | End: 2019-03-08

## 2018-10-05 RX ORDER — METFORMIN HCL 500 MG
1000 TABLET, EXTENDED RELEASE 24 HR ORAL 2 TIMES DAILY WITH MEALS
Qty: 360 TABLET | Refills: 0 | Status: SHIPPED | OUTPATIENT
Start: 2018-10-05 | End: 2019-05-03

## 2018-10-05 NOTE — NURSING NOTE
Prior to injection verified patient identity using patient's name and date of birth.  Due to injection administration, patient instructed to remain in clinic for 15 minutes  afterwards, and to report any adverse reaction to me immediately. Vaccine information supplied.      Injectable Influenza Immunization Documentation    1.  Is the person to be vaccinated sick today?   No    2. Does the person to be vaccinated have an allergy to a component   of the vaccine?   No  Egg Allergy Algorithm Link    3. Has the person to be vaccinated ever had a serious reaction   to influenza vaccine in the past?   No    4. Has the person to be vaccinated ever had Guillain-Barré syndrome?   No    Form completed by Pedro Luis Salinas

## 2018-10-05 NOTE — MR AVS SNAPSHOT
After Visit Summary   10/5/2018    Corey Hooper    MRN: [id number]           Patient Information     Date Of Birth          1965        Visit Information        Provider Department      10/5/2018 10:30 AM Giles Hopkins MD [facility]        Today's Diagnoses     Type 2 diabetes mellitus without complication, without long-term current use of insulin (H)    -  1    Hypothyroidism, unspecified type        Need for prophylactic vaccination and inoculation against influenza          Care Instructions    Consider eye exam and requesting the report be sent to us.          Follow-ups after your visit        Who to contact     If you have questions or need follow up information about today's clinic visit or your schedule please contact [facility] directly at [phone].  Normal or non-critical lab and imaging results will be communicated to you by MyChart, letter or phone within 4 business days after the clinic has received the results. If you do not hear from us within 7 days, please contact the clinic through MyChart or phone. If you have a critical or abnormal lab result, we will notify you by phone as soon as possible.  Submit refill requests through [employer] or call your pharmacy and they will forward the refill request to us. Please allow 3 business days for your refill to be completed.          Additional Information About Your Visit        MyCharZientia Information     [employer] gives you secure access to your electronic health record. If you see a primary care provider, you can also send messages to your care team and make appointments. If you have questions, please call your primary care clinic.  If you do not have a primary care provider, please call [phone] and they will assist you.        Care EveryWhere ID     This is your Care EveryWhere ID. This could be used by other organizations to access your [facility] medical records  [id number]    "     Your Vitals Were     Pulse Temperature Respirations Height Pulse Oximetry BMI (Body Mass Index)    68 97.9  F (36.6  C) (Oral) 14 5' 2.5\" (1.588 m) 98% 20.52 kg/m2       Blood Pressure from Last 3 Encounters:   10/05/18 132/80   08/21/18 108/80   06/25/18 114/79    Weight from Last 3 Encounters:   10/05/18 114 lb (51.7 kg)   08/21/18 114 lb 3.2 oz (51.8 kg)   06/25/18 120 lb 9.6 oz (54.7 kg)              We Performed the Following     FLU VACCINE, (RIV4) RECOMBINANT HA  , IM (FluBlok, egg free) [64672]- >18 YRS ([facility] recommended  50-64 YRS)     Hemoglobin A1c     TSH with free T4 reflex     Vaccine Administration, Initial [32484]          Where to get your medicines      These medications were sent to [facility] - [place], [place] - [address]  [address], [place] [address]     Phone:  [phone]     glipiZIDE 10 MG 24 hr tablet    metFORMIN 500 MG 24 hr tablet          Primary Care Provider Fax #    Physician No Ref-Primary [phone]       No address on file        Equal Access to Services     TANI GEE : Toi reardono Soralph, waaxda luqadaha, qaybta kaalmada adeegyada, giovany gomez. So [facility] [phone].    ATENCIÓN: Si habla español, tiene a valladares disposición servicios gratuitos de asistencia lingüística. Llame al [phone].    We comply with applicable federal civil rights laws and [place] laws. We do not discriminate on the basis of race, color, national origin, age, disability, sex, sexual orientation, or gender identity.            Thank you!     Thank you for choosing [facility]  for your care. Our goal is always to provide you with excellent care. Hearing back from our patients is one way we can continue to improve our services. Please take a few minutes to complete the written survey that you may receive in the mail after your visit with us. Thank you!             Your Updated Medication List - Protect " others around you: Learn how to safely use, store and throw away your medicines at www.disposemymeds.org.          This list is accurate as of 10/5/18  1:45 PM.  Always use your most recent med list.                   Brand Name Dispense Instructions for use Diagnosis    ARIPiprazole 10 MG tablet    ABILIFY     Take 10 mg by mouth daily        gabapentin 400 MG capsule    NEURONTIN     TK 2 CS PO D        glipiZIDE 10 MG 24 hr tablet    glipiZIDE XL    180 tablet    Take 2 tablets (20 mg) by mouth daily    Type 2 diabetes mellitus without complication, without long-term current use of insulin (H)       levothyroxine 200 MCG tablet    SYNTHROID/LEVOTHROID    90 tablet    Take 1 tablet (200 mcg) by mouth daily    Hypothyroidism, unspecified type       metFORMIN 500 MG 24 hr tablet    GLUCOPHAGE-XR    360 tablet    Take 2 tablets (1,000 mg) by mouth 2 times daily (with meals)    Type 2 diabetes mellitus without complication, without long-term current use of insulin (H)       simvastatin 40 MG tablet    ZOCOR    90 tablet    Take 1 tablet (40 mg) by mouth At Bedtime    Hyperlipidemia LDL goal <70

## 2018-10-05 NOTE — PROGRESS NOTES
"  SUBJECTIVE:   Corey Hooper is a 53 year old male who presents to clinic today for the following health issues:    Diabetes Follow-up    Patient is checking blood sugars: once daily.  Results are as follows:         am - within normal limits    Diabetic concerns: None     Symptoms of hypoglycemia (low blood sugar): none     Paresthesias (numbness or burning in feet) or sores: No     Date of last diabetic eye exam: past one year    BP Readings from Last 2 Encounters:   10/05/18 132/80   08/21/18 108/80     Hemoglobin A1C (%)   Date Value   10/05/2018 11.8 (H)   06/25/2018 12.7 (H)     LDL Cholesterol Calculated (mg/dL)   Date Value   06/25/2018 127 (H)   03/26/2018 190 (H)       Diabetes Management Resources    Amount of exercise or physical activity: 2-3 days/week for an average of 30-45 minutes    Problems taking medications regularly: No    Medication side effects: none    Diet: diabetic    Patient does not want to start insulin at this time (discussed at last visit with Tomasa Deal).    He feels his BG when he checks it is between 150-200 and feels he is responding to and tolerating current medications.    Would like to continue these.    EXAM:  /80 (BP Location: Right arm, Patient Position: Sitting, Cuff Size: Adult Regular)  Pulse 68  Temp 97.9  F (36.6  C) (Oral)  Resp 14  Ht 5' 2.5\" (1.588 m)  Wt 114 lb (51.7 kg)  SpO2 98%  BMI 20.52 kg/m2  Constitutional: Healthy, alert, no distress   Cardiovascular: RRR. No murmurs   Respiratory: Clear to auscultation     ASSESSMENT    ICD-10-CM    1. Type 2 diabetes mellitus without complication, without long-term current use of insulin (H) E11.9 Hemoglobin A1c     glipiZIDE (GLIPIZIDE XL) 10 MG 24 hr tablet     metFORMIN (GLUCOPHAGE-XR) 500 MG 24 hr tablet   2. Hypothyroidism, unspecified type E03.9 TSH with free T4 reflex   3. Need for prophylactic vaccination and inoculation against influenza Z23 Vaccine Administration, Initial [08086]     FLU VACCINE, " (RIV4) RECOMBINANT HA  , IM (FluBlok, egg free) [38505]- >18 YRS ([facility] recommended  50-64 YRS)     CANCELED: FLU VACCINE, SPLIT VIRUS, IM (QUADRIVALENT) [09944]- >3 YRS      Plan:  Patient Instructions   Consider eye exam and requesting the report be sent to us.  will check TSH  Will share note with MTM. Continue to consider basal insulin as suggested by MTM. Will follow hgba1c.     Giles Esparza MD  Family Medicine Physician     Addendum:  Recommending decreased dose of levothyroxine. Message sent to patient. New script sent in. Lab order placed to check TSH in 6-8 weeks.    Giles Esparza MD  Family Medicine Physician

## 2018-11-16 ENCOUNTER — TELEPHONE (OUTPATIENT)
Dept: LAB | Facility: CLINIC | Age: 53
End: 2018-11-16

## 2018-11-17 NOTE — TELEPHONE ENCOUNTER
Called patient and advised of need to recheck A1C levels. Offered to schedule appointment for lab, patient stated he will return call to schedule appointment for lab.

## 2019-01-09 ENCOUNTER — TELEPHONE (OUTPATIENT)
Dept: FAMILY MEDICINE | Facility: CLINIC | Age: 54
End: 2019-01-09

## 2019-01-09 NOTE — TELEPHONE ENCOUNTER
Dr. Bautista,    Received call from patient's mother, consent to communicate on file.Mother states the patient has generally not been feeling good. Writer asked to speak with patient, mother states he was not agreeable at this time    Mother states patient has not been eating much, but this is not abnormal for the patient. She states the patient states he is never feeling well. Mother reports that the patient will spend 45 minutes in the bathroom at a time and feels it is related to constipation, although patient will not directly tell her. Writer advised mother to encourage patient to do the following, increase dietary intake, eat foods high in fiber, drink plenty of fluids, be active to help get bowels moving.     Mother is wondering if it might be helpful to get a colonoscopy or EGD for patient. Mother states the patient takes handfuls of aspirin. Writer asked why patient taking so much aspirin, mother states the patient is telling her he has a cold, even though the mother knows he does not. Writer advised mother to encourage patient to discontinue the use of aspirin and use another NSAID to help with pain. Mother is wanted EGD or colonoscopy to assess for possible ulcers.     Mother reports that patient is always stating he is sad and does not feel well. Writer advised scheduling an office visit to come in and discuss these symptoms. Patient is unable to do so at this time as he is out in [place] until 01/21/2018. Mother would like to be preset at visit. She is in [place] until April. Would a telephone visit be appropriate for this request?    Mother wondering if there was anything discussed at the last office visit that she could help patient schedule. Per chart review, patient due to have TSH level checked, lab only appointment scheduled 01/23/2019. Are there any other labs you would like checked at this visit? Writer also gave patient's mother MTM referral information.     Mother does report that patient has been  more complient with medications.     Please advise    Thank You!  Pooja Red, RN  Triage Nurse

## 2019-01-10 NOTE — TELEPHONE ENCOUNTER
Patient is overdue for thyroid test, future order placed. Should schedule visit with me after that is completed.    Giles Esparza MD  Family Medicine Physician

## 2019-01-10 NOTE — TELEPHONE ENCOUNTER
LVM requesting a call back to clinic to discuss Giles Esparza MD message below. (Home number was called and it forwarded to the mobile #.)    Thanks! Migdalia Haider RN

## 2019-01-11 NOTE — TELEPHONE ENCOUNTER
"Pt called back and was advised of the message from Dr Bautista  He said \"i dont know if my mom called?\" I did let him know that she had called.     He seemed fine and said he would call for appointment's when he gets back from [place] in 2 weeks.     Thanks!     Kylie Mcclendon RN    "

## 2019-02-04 ENCOUNTER — TELEPHONE (OUTPATIENT)
Dept: FAMILY MEDICINE | Facility: CLINIC | Age: 54
End: 2019-02-04

## 2019-02-04 NOTE — TELEPHONE ENCOUNTER
Duane Esparza MD     Please review message below from patient's mother     Barbara Burton, Registered Nurse   [facility]

## 2019-02-04 NOTE — TELEPHONE ENCOUNTER
Corey's Mother Sharon calling to report some symptoms she has observed over the last month while he was staying with her out of town- Consent to communicate on file-      Pt is always cold and has chills, night sweats, seems to have frequent upset stomachs with pain in the right side with diarrhea or constipation which pt is medicating with OTC options, extreme light sensitivity- drawing blinds and turning down brightness on screens, extremely tired, sleeping a lot. Declines speaking with FNA about symptoms. Mother does not want a call back, just wants Dr.Hanson Esparza to be aware of this. Roxie also reports Corey states he will call to make an appointment to see MD soon. If Dr.Hanson Esparza has any questions please feel free to call Roxie at [phone] OK to . Thank You.    [place], [facility]  Diabetes and Nutrition Scheduling

## 2019-02-06 NOTE — TELEPHONE ENCOUNTER
Appreciate input, no visit yet scheduled, please reach out to patient - agree with him scheduling a visit to be seen.    Giles Esparza MD  Family Medicine Physician

## 2019-02-13 DIAGNOSIS — E11.9 TYPE 2 DIABETES MELLITUS WITHOUT COMPLICATION, WITHOUT LONG-TERM CURRENT USE OF INSULIN (H): ICD-10-CM

## 2019-02-14 NOTE — TELEPHONE ENCOUNTER
"Requested Prescriptions   Pending Prescriptions Disp Refills     glipiZIDE (GLIPIZIDE XL) 10 MG 24 hr tablet      Last Written Prescription Date:  10/5/2018  Last Fill Quantity: 180 tablet    ,  # refills: 0   Last Office Visit: 10/5/2018   Future Office Visit:    Next 5 appointments (look out 90 days)    Feb 20, 2019  9:30 AM CST  Office Visit with Giles Esparza MD  [facility] ([facility]) [address] [place] [id number] [phone]        180 tablet 0     Sig: Take 2 tablets (20 mg) by mouth daily    Sulfonylurea Agents Failed - 2/13/2019  7:10 PM       Failed - Patient has had a Microalbumin in the past 12 mos.    No lab results found.         Failed - Patient has documented A1c within the specified period of time.    If HgbA1C is 8 or greater, it needs to be on file within the past 3 months.  If less than 8, must be on file within the past 6 months.     Recent Labs   Lab Test 10/05/18  1032   A1C 11.8*          Failed - Patient has a recent creatinine (normal) within the past 12 mos.    Recent Labs   Lab Test 06/25/18  1054   CR 0.65*          Passed - Blood pressure less than 140/90 in past 6 months    BP Readings from Last 3 Encounters:   10/05/18 132/80   08/21/18 108/80   06/25/18 114/79          Passed - Patient has documented LDL within the past 12 mos.    Recent Labs   Lab Test 06/25/18  1054   *          Passed - Medication is active on med list       Passed - Patient is age 18 or older       Passed - Recent (6 mo) or future (30 days) visit within the authorizing provider's specialty    Patient had office visit in the last 6 months or has a visit in the next 30 days with authorizing provider or within the authorizing provider's specialty.  See \"Patient Info\" tab in inbasket, or \"Choose Columns\" in Meds & Orders section of the refill encounter.              "

## 2019-02-17 RX ORDER — GLIPIZIDE 10 MG/1
20 TABLET, FILM COATED, EXTENDED RELEASE ORAL DAILY
Qty: 60 TABLET | Refills: 0 | Status: SHIPPED | OUTPATIENT
Start: 2019-02-17 | End: 2019-03-28

## 2019-02-17 NOTE — TELEPHONE ENCOUNTER
Medication is being filled for 1 time refill only due to:  Patient needs to be seen because needs recheck.   Cynthia Pina RN

## 2019-02-22 DIAGNOSIS — E11.9 TYPE 2 DIABETES MELLITUS WITHOUT COMPLICATION, WITHOUT LONG-TERM CURRENT USE OF INSULIN (H): ICD-10-CM

## 2019-02-22 RX ORDER — GLIPIZIDE 10 MG/1
TABLET, FILM COATED, EXTENDED RELEASE ORAL
Qty: 0.1 TABLET | Refills: 0 | OUTPATIENT
Start: 2019-02-22

## 2019-02-22 NOTE — TELEPHONE ENCOUNTER
LM to return clinic phone call. Patient is due for a diabetic f/u.  Sending letter.     Emmy HERNADEZ     [facility]

## 2019-02-22 NOTE — LETTER
[facility]  [address] [address]  Phone: [phone]    02/22/19    Corey Hooper  [address] [place] [address]  [address]  [place] [address]      To whom it may concern:     In order to ensure we are providing the best quality care, we have reviewed your chart and see that you are due for a diabetic follow up.    For future medication refills, please contact your primary care clinic to schedule a diabetic follow up appointment. This can be requested via [employer] or by calling the clinic at [phone].    We greatly appreciate the opportunity to serve you. Thank you for trusting us with your health care.      Sincerely,      Your care team at [facility]

## 2019-02-22 NOTE — TELEPHONE ENCOUNTER
duplicate  glipiZIDE (GLIPIZIDE XL) 10 MG 24 hr tablet 60 tablet 0 2/17/2019       Reception: please call and schedule diabetic check.  thanks

## 2019-03-08 ENCOUNTER — OFFICE VISIT (OUTPATIENT)
Dept: FAMILY MEDICINE | Facility: CLINIC | Age: 54
End: 2019-03-08
Payer: MEDICARE

## 2019-03-08 VITALS
HEART RATE: 64 BPM | TEMPERATURE: 97.7 F | OXYGEN SATURATION: 97 % | BODY MASS INDEX: 20.34 KG/M2 | RESPIRATION RATE: 14 BRPM | DIASTOLIC BLOOD PRESSURE: 84 MMHG | WEIGHT: 113 LBS | SYSTOLIC BLOOD PRESSURE: 118 MMHG

## 2019-03-08 DIAGNOSIS — E06.9 THYROIDITIS: ICD-10-CM

## 2019-03-08 DIAGNOSIS — E11.9 TYPE 2 DIABETES MELLITUS WITHOUT COMPLICATION, WITHOUT LONG-TERM CURRENT USE OF INSULIN (H): Primary | ICD-10-CM

## 2019-03-08 DIAGNOSIS — E78.5 HYPERLIPIDEMIA LDL GOAL <70: ICD-10-CM

## 2019-03-08 LAB
ALBUMIN SERPL-MCNC: 4.3 G/DL (ref 3.4–5)
ALP SERPL-CCNC: 33 U/L (ref 40–150)
ALT SERPL W P-5'-P-CCNC: 35 U/L (ref 0–70)
ANION GAP SERPL CALCULATED.3IONS-SCNC: 10 MMOL/L (ref 3–14)
AST SERPL W P-5'-P-CCNC: 16 U/L (ref 0–45)
BILIRUB SERPL-MCNC: 0.5 MG/DL (ref 0.2–1.3)
BUN SERPL-MCNC: 11 MG/DL (ref 7–30)
CALCIUM SERPL-MCNC: 9.4 MG/DL (ref 8.5–10.1)
CHLORIDE SERPL-SCNC: 102 MMOL/L (ref 94–109)
CHOLEST SERPL-MCNC: 203 MG/DL
CO2 SERPL-SCNC: 25 MMOL/L (ref 20–32)
CREAT SERPL-MCNC: 0.51 MG/DL (ref 0.66–1.25)
GFR SERPL CREATININE-BSD FRML MDRD: >90 ML/MIN/{1.73_M2}
GLUCOSE SERPL-MCNC: 351 MG/DL (ref 70–99)
HBA1C MFR BLD: 12.3 % (ref 0–5.6)
HDLC SERPL-MCNC: 71 MG/DL
LDLC SERPL CALC-MCNC: 105 MG/DL
NONHDLC SERPL-MCNC: 132 MG/DL
POTASSIUM SERPL-SCNC: 4.5 MMOL/L (ref 3.4–5.3)
PROT SERPL-MCNC: 7.2 G/DL (ref 6.8–8.8)
SODIUM SERPL-SCNC: 137 MMOL/L (ref 133–144)
T4 FREE SERPL-MCNC: 1.49 NG/DL (ref 0.76–1.46)
TRIGL SERPL-MCNC: 134 MG/DL
TSH SERPL DL<=0.005 MIU/L-ACNC: 22.17 MU/L (ref 0.4–4)

## 2019-03-08 PROCEDURE — 84443 ASSAY THYROID STIM HORMONE: CPT | Performed by: FAMILY MEDICINE

## 2019-03-08 PROCEDURE — 83036 HEMOGLOBIN GLYCOSYLATED A1C: CPT | Performed by: FAMILY MEDICINE

## 2019-03-08 PROCEDURE — 80061 LIPID PANEL: CPT | Performed by: FAMILY MEDICINE

## 2019-03-08 PROCEDURE — 80053 COMPREHEN METABOLIC PANEL: CPT | Performed by: FAMILY MEDICINE

## 2019-03-08 PROCEDURE — 36415 COLL VENOUS BLD VENIPUNCTURE: CPT | Performed by: FAMILY MEDICINE

## 2019-03-08 PROCEDURE — 99213 OFFICE O/P EST LOW 20 MIN: CPT | Performed by: FAMILY MEDICINE

## 2019-03-08 PROCEDURE — 84439 ASSAY OF FREE THYROXINE: CPT | Performed by: FAMILY MEDICINE

## 2019-03-08 NOTE — PATIENT INSTRUCTIONS
Due to the power outage, we were not able to get the results of the A1c while you were in the office.  I recommend we start a once daily insulin called Lantus at 20 units nightly and recheck your hgba1c in 3 months.  This is to be taken while continuing the oral medications.  If you find that you would like to discuss this or have some guidance on how to administer this infection, please let me know so that I can assist in this.      Will check thyroid level as well with labs today and consider adjustment if appropriate.

## 2019-03-08 NOTE — PROGRESS NOTES
SUBJECTIVE:   Corey Hooper is a 53 year old male who presents to clinic today for the following health issues:      Diabetes Follow-up    Answers for HPI/ROS submitted by the patient on 3/8/2019   Chronic problems general questions HPI Form  Frequency of checking blood sugars:: 1 time a day  Diabetic concerns:: None  Hypoglycemia symptoms:: None  Paraesthesia present:: No  Eye Exam in the last year:: Yes  Date of last Diabetic Eye Exam:: feb 2018        BP Readings from Last 2 Encounters:   03/08/19 118/84   10/05/18 132/80     Hemoglobin A1C (%)   Date Value   03/08/2019 12.3 (H)   10/05/2018 11.8 (H)     LDL Cholesterol Calculated (mg/dL)   Date Value   06/25/2018 127 (H)   03/26/2018 190 (H)     Patient taking and tolerating medications well.    EXAM:  /84   Pulse 64   Temp 97.7  F (36.5  C)   Resp 14   Wt 51.3 kg (113 lb)   SpO2 97%   BMI 20.34 kg/m    Constitutional: Healthy, alert, no distress   Cardiovascular: RRR. No murmurs   Respiratory: Clear to auscultation     ASSESSMENT    ICD-10-CM    1. Diabetes mellitus (H) E11.9    2. Type 2 diabetes mellitus without complication, without long-term current use of insulin (H) E11.9 TSH with free T4 reflex     Comprehensive metabolic panel (BMP + Alb, Alk Phos, ALT, AST, Total. Bili, TP)     Lipid panel reflex to direct LDL Fasting     Hemoglobin A1c      Plan:  Patient Instructions   Due to the power outage, we were not able to get the results of the A1c while you were in the office.  I recommend we start a once daily insulin called Lantus at 20 units nightly and recheck your hgba1c in 3 months.  This is to be taken while continuing the oral medications.  If you find that you would like to discuss this or have some guidance on how to administer this infection, please let me know so that I can assist in this.      Will check thyroid level as well with labs today and consider adjustment if appropriate.       Return in about 3 months (around 6/8/2019)  for Routine Visit, Lab Work.    Giles Esparza MD  Family Medicine Physician

## 2019-03-11 RX ORDER — LEVOTHYROXINE SODIUM 175 UG/1
175 TABLET ORAL DAILY
Qty: 90 TABLET | Refills: 1 | Status: SHIPPED | OUTPATIENT
Start: 2019-03-11 | End: 2020-02-12

## 2019-03-11 RX ORDER — ATORVASTATIN CALCIUM 40 MG/1
40 TABLET, FILM COATED ORAL DAILY
Qty: 90 TABLET | Refills: 3 | Status: SHIPPED | OUTPATIENT
Start: 2019-03-11 | End: 2020-01-21

## 2019-03-18 ENCOUNTER — OFFICE VISIT (OUTPATIENT)
Dept: FAMILY MEDICINE | Facility: CLINIC | Age: 54
End: 2019-03-18
Payer: MEDICARE

## 2019-03-18 VITALS
DIASTOLIC BLOOD PRESSURE: 72 MMHG | WEIGHT: 110 LBS | RESPIRATION RATE: 14 BRPM | SYSTOLIC BLOOD PRESSURE: 104 MMHG | HEART RATE: 55 BPM | BODY MASS INDEX: 19.8 KG/M2 | OXYGEN SATURATION: 99 % | TEMPERATURE: 97.7 F

## 2019-03-18 DIAGNOSIS — E03.9 HYPOTHYROIDISM, UNSPECIFIED TYPE: ICD-10-CM

## 2019-03-18 DIAGNOSIS — E11.9 TYPE 2 DIABETES MELLITUS WITHOUT COMPLICATION, WITHOUT LONG-TERM CURRENT USE OF INSULIN (H): Primary | ICD-10-CM

## 2019-03-18 PROCEDURE — 36415 COLL VENOUS BLD VENIPUNCTURE: CPT | Performed by: FAMILY MEDICINE

## 2019-03-18 PROCEDURE — 86376 MICROSOMAL ANTIBODY EACH: CPT | Performed by: FAMILY MEDICINE

## 2019-03-18 PROCEDURE — 99213 OFFICE O/P EST LOW 20 MIN: CPT | Performed by: FAMILY MEDICINE

## 2019-03-18 NOTE — PATIENT INSTRUCTIONS
As we discussed in the lab result notice, I recommend thyroid ultrasound, follow-up with endocrionlogy - will try to find endocrinology in [facility], change from simvastatin to atorvastatin - goal to aim for LDL less than 70. If too expensive could go back to 40mg simvastatin.  I understand you are not comfortable starting Lantus, I recommend discussing diabetes care plan with our pharmacist, Tomasa Deal in the near future.  Will check thyroid antibody test today.

## 2019-03-18 NOTE — PROGRESS NOTES
SUBJECTIVE:   Corey Hooper is a 53 year old male who presents to clinic today for the following health issues:      Consult - Blood work    Medication Followup of levothyroxine and atorvastatin     Taking Medication as prescribed: yes    Side Effects:  None    Medication Helping Symptoms:  yes     Patient is averse/not willing to start Lantus    EXAM:  /72   Pulse 55   Temp 97.7  F (36.5  C)   Resp 14   Wt 49.9 kg (110 lb)   SpO2 99%   BMI 19.80 kg/m    Constitutional: Healthy, alert, no distress       ASSESSMENT    ICD-10-CM    1. Type 2 diabetes mellitus without complication, without long-term current use of insulin (H) E11.9 MED THERAPY MANAGE REFERRAL   2. Hypothyroidism, unspecified type E03.9 ENDOCRINOLOGY ADULT REFERRAL     US Thyroid     Thyroid peroxidase antibody      Plan:  Patient Instructions   As we discussed in the lab result notice, I recommend thyroid ultrasound, follow-up with endocrionlogy - will try to find endocrinology in [facility], change from simvastatin to atorvastatin - goal to aim for LDL less than 70. If too expensive could go back to 40mg simvastatin.  I understand you are not comfortable starting Lantus, I recommend discussing diabetes care plan with our pharmacist, Tomasa Deal in the near future.  Will check thyroid antibody test today.       Return for Routine Visit for diabetes care.    Giles Esparza MD  Family Medicine Physician

## 2019-03-19 LAB — THYROPEROXIDASE AB SERPL-ACNC: 53 IU/ML

## 2019-03-20 ENCOUNTER — TELEPHONE (OUTPATIENT)
Dept: PHARMACY | Facility: OTHER | Age: 54
End: 2019-03-20

## 2019-03-20 NOTE — TELEPHONE ENCOUNTER
MTM referral from: [facility] visit (referral by provider)    MTM referral outreach attempt #2 on March 20, 2019 at 4:21 PM      Outcome: Patient not reachable after several attempts, will route to MTM Pharmacist/Provider as an FYI. Thank you for the referral.    Jessenia Varghese, MTM Coordinator

## 2019-03-28 DIAGNOSIS — E11.9 TYPE 2 DIABETES MELLITUS WITHOUT COMPLICATION, WITHOUT LONG-TERM CURRENT USE OF INSULIN (H): ICD-10-CM

## 2019-03-28 NOTE — TELEPHONE ENCOUNTER
"Requested Prescriptions   Pending Prescriptions Disp Refills     glipiZIDE (GLUCOTROL XL) 10 MG 24 hr tablet [Pharmacy Med Name: GLIPIZIDE ER 10MG TABLETS]  Last Written Prescription Date:  2/17/2019  Last Fill Quantity: 60 tabs,  # refills: 0   Last office visit: 3/18/2019 with prescribing provider:  Anival Esparza   Future Office Visit:   Next 5 appointments (look out 90 days)    Apr 09, 2019  1:00 PM CDT  Office Visit with Tomasa Deal [facility] ([facility]) [address] [place] [address]  [phone]          60 tablet 0     Sig: TAKE 2 TABLETS BY MOUTH EVERY DAY    Sulfonylurea Agents Failed - 3/28/2019  9:27 AM       Failed - Patient has had a Microalbumin in the past 12 mos.    No lab results found.         Failed - Patient has a recent creatinine (normal) within the past 12 mos.    Recent Labs   Lab Test 03/08/19  1030   CR 0.51*            Passed - Blood pressure less than 140/90 in past 6 months    BP Readings from Last 3 Encounters:   03/18/19 104/72   03/08/19 118/84   10/05/18 132/80                Passed - Patient has documented LDL within the past 12 mos.    Recent Labs   Lab Test 03/08/19  1030   *            Passed - Patient has documented A1c within the specified period of time.    If HgbA1C is 8 or greater, it needs to be on file within the past 3 months.  If less than 8, must be on file within the past 6 months.     Recent Labs   Lab Test 03/08/19  1030   A1C 12.3*            Passed - Medication is active on med list       Passed - Patient is age 18 or older       Passed - Recent (6 mo) or future (30 days) visit within the authorizing provider's specialty    Patient had office visit in the last 6 months or has a visit in the next 30 days with authorizing provider or within the authorizing provider's specialty.  See \"Patient Info\" tab in inbasket, or \"Choose Columns\" in Meds & Orders section of the refill encounter.  "

## 2019-04-02 RX ORDER — GLIPIZIDE 10 MG/1
TABLET, FILM COATED, EXTENDED RELEASE ORAL
Qty: 180 TABLET | Refills: 0 | Status: SHIPPED | OUTPATIENT
Start: 2019-04-02 | End: 2019-07-08

## 2019-04-02 NOTE — TELEPHONE ENCOUNTER
LM to return clinic phone call. Patient is due for lab only appt for urine test.  Pt can also do it at next f/u appt with provider.   Patient informed that a medication refill was sent to their pharmacy.       Sending [employer] message.    Emmy HERNADEZ     [facility]

## 2019-04-02 NOTE — TELEPHONE ENCOUNTER
Routing refill request to provider for review/approval because:  Labs out of range:  creatinine  -Microalbumin not on file    Microalbumin ordered.    Dr. Anival Esparza-Please sign if agree.  One month supply pended.    Team coordinators-Please contact patient to schedule lab appt.  This is a urine test to check for protein in urine.    Thank you!  SARA CastellanoN, RN

## 2019-04-08 NOTE — PROGRESS NOTES
.  SUBJECTIVE/OBJECTIVE:                           Corey Hooper is a 53 year old male coming in for a follow-up (8-21-18) visit for Medication Therapy Management.  He was referred to me from Dr. Duane Monroe.      TC--Use [facility] when billing.      Chief Complaint:  Here for DM f/up , last 3 years problem     He is eating low fat and sf foods, pop and twinkies in the past .     Agreed to leave urine albumin at end of visit today.       Personal Healthcare Goals: fix dm w/out insulin, gain 20+ lbs.     Allergies/ADRs: Reviewed in Epic  Tobacco: No tobacco use  Alcohol: not currently using  Caffeine: 1-2 sodas/day, 1-2 coffee's/day .   Activity: belongs to gym - goes 2-3 x month .   PMH: Reviewed in Epic    Medication Adherence/Access:  no issues reported    Diabetes:  Pt currently taking metformin er 500mg --2 tabs bid , glipizide 2 x10mgs qam.. Pt is not experiencing side effects.  SMBG: one time daily, two times per week.   Ranges (patient reported): fasting in am - 200+, 2 hrs post supper - 250+   Patient is not experiencing hypoglycemia  Recent symptoms of high blood sugar? Anxious, depressed, lethargic  Eye exam: due  Foot exam: up to date  ACEi/ARB: No.   Urine Albumin: No results found for: UMALCR -patient agreed to baseline lab today at end of our visit.   Aspirin: 1/4 tab of 325mg asa daily now.   Diet/Exercise: b-fast - frosted flakes and soy milk, snacks -apples, potato chips, lunch - skips --, dinner- eats frozen meals a lot , popcorn, drinks - pop     Lab Results   Component Value Date    A1C 12.3 03/08/2019    A1C 11.8 10/05/2018    A1C 12.7 06/25/2018    A1C 14.4 03/26/2018            Schizo-affective disorder: Taking abilify 10mg./day --stable mental health wise --we discussed this drug can increase blood sugars but more important that he keep taking it for mental health help.       Hyperlipidemia: Current therapy includes : atorvastatin 40mg once daily.  Pt reports no significant myalgias or  "other side effects.  The 10-year ASCVD risk score (Yetino RAI Jr., et al., 2013) is: 4.5%    Values used to calculate the score:      Age: 53 years      Sex: Male      Is Non- : No      Diabetic: Yes      Tobacco smoker: No      Systolic Blood Pressure: 104 mmHg      Is BP treated: No      HDL Cholesterol: 71 mg/dL      Total Cholesterol: 203 mg/dL     Recent Labs   Lab Test 03/08/19  1030 06/25/18  1054   CHOL 203* 240*   HDL 71 103   * 127*   TRIG     134 50       BP Readings from Last 1 Encounters:   04/09/19 122/86     Pulse Readings from Last 1 Encounters:   04/09/19 96     Wt Readings from Last 1 Encounters:   04/09/19 115 lb (52.2 kg)     Ht Readings from Last 1 Encounters:   10/05/18 5' 2.5\" (1.588 m)     Estimated body mass index is 20.7 kg/m  as calculated from the following:    Height as of 10/5/18: 5' 2.5\" (1.588 m).    Weight as of this encounter: 115 lb (52.2 kg).    Temp Readings from Last 1 Encounters:   03/18/19 97.7  F (36.5  C)           ASSESSMENT:                             Current medications were reviewed today.     Medication Adherence: excellent, no issues identified    Diabetes: Needs Improvement. Patient is not meeting A1c goal of < 7%--FYI--He declines insulin shots today!  . Self monitoring of blood glucose is not at goal of fasting  mg/dL and post prandial < 150 mg/dL. Pt would benefit from minimum SMBG: Check blood sugars fasting, and occasionally 2 hours after starting a meal.  Metformin :  stay on the same dose.  SFU (glipizide) :  stay on the same dose.  DPP4-(januvia): start 100mg./day tablet   Increased exercise as tolerated.   Weight gain recommended--gave my 1 page food/diet handout --spent majority of visit educating patient that he needs to REDUCE carbs/sugars in diet to store bs and gain weight , also we googled american diabetes websiet for meal plans so he know /has ideas of what foods to cook/eat .   Aspirin therapy is safe and " appropriate. Aspirin therapy is indicated in this patient at dose of 81mg daily. Pt is now taking aspirin.     Schizo-affective disorder:  Stable    Hyperlipidemia: Stable. Pt is on high intensity statin which is indicated based on 2013 ACC/AHA guidelines for lipid management.        PLAN:                              1.  FYI--lets ADD Januvia 100mg./day -- and stay on your Metformin and glipizide as is , please check blood sugars twice daily --before first meal of the day and 2 hours after any main meal.    Do your best to change your diet - to a lower carbohydrate (less starches/sugars) , eat more protein and fats .    Lower blood sugars will naturally make you gain weight .     2. American Diabetes association has meal plans for you --google them or check www.diabetes.org.         Next MTM visit: see me Tuesday may14th -2019 at 2;30pm  With your  blood sugar meter .     I spent 30 minutes with this patient today. All changes were made via collaborative practice agreement with Dr. Bautista. A copy of the visit note was provided to the patient's primary care and referring provider.        The patient was given a summary of these recommendations as an after visit summary.     Tomasa Dael Rph.  Medication Therapy Management Provider  [phone]

## 2019-04-09 ENCOUNTER — MYC MEDICAL ADVICE (OUTPATIENT)
Dept: PHARMACY | Facility: CLINIC | Age: 54
End: 2019-04-09

## 2019-04-09 ENCOUNTER — OFFICE VISIT (OUTPATIENT)
Dept: PHARMACY | Facility: CLINIC | Age: 54
End: 2019-04-09
Attending: FAMILY MEDICINE
Payer: COMMERCIAL

## 2019-04-09 VITALS
BODY MASS INDEX: 20.7 KG/M2 | DIASTOLIC BLOOD PRESSURE: 86 MMHG | SYSTOLIC BLOOD PRESSURE: 122 MMHG | WEIGHT: 115 LBS | HEART RATE: 96 BPM | OXYGEN SATURATION: 95 %

## 2019-04-09 DIAGNOSIS — E11.9 TYPE 2 DIABETES MELLITUS WITHOUT COMPLICATION, WITHOUT LONG-TERM CURRENT USE OF INSULIN (H): ICD-10-CM

## 2019-04-09 DIAGNOSIS — E78.5 HYPERLIPIDEMIA LDL GOAL <100: ICD-10-CM

## 2019-04-09 DIAGNOSIS — E11.9 TYPE 2 DIABETES MELLITUS WITHOUT COMPLICATION, WITHOUT LONG-TERM CURRENT USE OF INSULIN (H): Primary | ICD-10-CM

## 2019-04-09 DIAGNOSIS — F25.9 SCHIZOPHRENIA, SCHIZO-AFFECTIVE (H): ICD-10-CM

## 2019-04-09 PROCEDURE — 82043 UR ALBUMIN QUANTITATIVE: CPT | Performed by: FAMILY MEDICINE

## 2019-04-09 PROCEDURE — 99607 MTMS BY PHARM ADDL 15 MIN: CPT | Performed by: PHARMACIST

## 2019-04-09 PROCEDURE — 99605 MTMS BY PHARM NP 15 MIN: CPT | Performed by: PHARMACIST

## 2019-04-09 RX ORDER — ASPIRIN 81 MG/1
81 TABLET ORAL DAILY
COMMUNITY

## 2019-04-09 NOTE — Clinical Note
Duane--agatha-- added januvia and diet chnages --recheck his bs meter in 30 days before we discuss insulin.[place]

## 2019-04-09 NOTE — PATIENT INSTRUCTIONS
Recommendations from today's MTM visit:                                                        1.  FYI--lets ADD Januvia 100mg./day -- and stay on your Metformin and glipizide as is , please check blood sugars twice daily --before first meal of the day and 2 hours after any main meal.    Do your best to change your diet - to a lower carbohydrate (less starches/sugars) , eat more protein and fats .    Lower blood sugars will naturally make you gain weight .     2. American Diabetes association has meal plans for you --google them or check www.diabetes.org.         Next MTM visit: see me Tuesday may14th -2019 at 2;30pm  With your  blood sugar meter .       To schedule another MTM appointment, please call the clinic directly or you may call the MTM scheduling line at [phone] or toll-free at [phone].     My Clinical Pharmacist's contact information:                                                      It was a pleasure talking with you today!  Please feel free to contact me with any questions or concerns you have.      Tomasa Deal [facility].  Medication Therapy Management Provider  [phone]      You may receive a survey about the MTM services you received by email and/or US Mail.  I would appreciate your feedback to help me serve you better in the future. Your comments will be anonymous.

## 2019-04-09 NOTE — TELEPHONE ENCOUNTER
I would like to request new prescriptions for test strips and lances for blood sugar meter. One touch ultra. Thankyou      [facility] .    -orestes

## 2019-04-10 ENCOUNTER — TELEPHONE (OUTPATIENT)
Dept: FAMILY MEDICINE | Facility: CLINIC | Age: 54
End: 2019-04-10

## 2019-04-10 DIAGNOSIS — E11.9 TYPE 2 DIABETES MELLITUS WITHOUT COMPLICATION, WITHOUT LONG-TERM CURRENT USE OF INSULIN (H): Primary | ICD-10-CM

## 2019-04-10 LAB
CREAT UR-MCNC: 28 MG/DL
MICROALBUMIN UR-MCNC: <5 MG/L
MICROALBUMIN/CREAT UR: NORMAL MG/G CR (ref 0–17)

## 2019-04-10 RX ORDER — BLOOD-GLUCOSE METER
KIT MISCELLANEOUS
Qty: 1 KIT | Refills: 0 | Status: SHIPPED | OUTPATIENT
Start: 2019-04-10

## 2019-04-10 NOTE — TELEPHONE ENCOUNTER
Pended requested Diabetic supplies-this not on med list--PCP please sign off on Rx if correct. Pharmacy loaded.      Thanks! Migdalia Haider RN

## 2019-04-10 NOTE — TELEPHONE ENCOUNTER
Writer called patient's home number:  1. No answer and unable to leave message    Writer called patient's mobile number:  1. Left message to call back and ask to speak with an available triage nurse.    SARA CastellanoN, RN

## 2019-04-10 NOTE — TELEPHONE ENCOUNTER
One Touch Ultra Meter      Last Written Prescription Date:  NOT ON CURRENT MED LIST.   Last Fill Quantity:  0,   # refills: 0  Last Office Visit: 3-18-19  Future Office visit:    Next 5 appointments (look out 90 days)    May 14, 2019  2:30 PM CDT  SHORT with Tomasa Deal RPH  [facility] ([facility]) [address] [place] [id number] [phone]           Routing refill request to provider for review/approval because:  Drug not active on patient's medication list

## 2019-04-10 NOTE — TELEPHONE ENCOUNTER
Script signed, please clarify with patient if this meets his request.    Giles Esparza MD  Family Medicine Physician

## 2019-04-10 NOTE — TELEPHONE ENCOUNTER
Prior Authorization Retail Medication Request    Medication/Dose: sitagliptin (JANUVIA) 100 MG tablet  ICD code (if different than what is on RX):  Previously Tried and Failed:  Rationale:    Insurance Name:    Insurance ID:  [id number]    Pharmacy Information (if different than what is on RX)  Name:  Phone:    Please include previous medications tried and failed.  Please ask insurance for medications on formulary.

## 2019-04-11 ENCOUNTER — TELEPHONE (OUTPATIENT)
Dept: FAMILY MEDICINE | Facility: CLINIC | Age: 54
End: 2019-04-11

## 2019-04-11 NOTE — TELEPHONE ENCOUNTER
Prior Authorization Retail Medication Request    Medication/Dose: sitagliptin (JANUVIA) 100 MG tablet  ICD code (if different than what is on RX):  Previously Tried and Failed:  Rationale:    Insurance Name:    Insurance ID: [id number]    Pharmacy Information (if different than what is on RX)  Name:  Phone:    Please include previous medications tried and failed.  Please ask insurance for medications on formulary.

## 2019-04-12 NOTE — TELEPHONE ENCOUNTER
Pharmacy was called , he hasn't picked up One touch ultra meter yet.   Pharmacist ran it through insurance.   Pt already has the test strips for the meter but insurance won't pay for the meter . He has to pay for the meter, 22 dollars    If he doesn't pay for that meter it would be a mess because he picked up the strips for that meter and they would need to be returned.    Attempted to reach Corey on both his numbers, unable. Left message  to call back.          Ashanti Singh RN

## 2019-04-16 NOTE — TELEPHONE ENCOUNTER
Pharmacy was called.They think he already has the machine, just needed the strips and the lancets.   Nothing else needed at this time    Ashanti Singh RN, BSN

## 2019-05-02 DIAGNOSIS — E11.9 TYPE 2 DIABETES MELLITUS WITHOUT COMPLICATION, WITHOUT LONG-TERM CURRENT USE OF INSULIN (H): ICD-10-CM

## 2019-05-02 NOTE — TELEPHONE ENCOUNTER
Medicare requires and explanation for testing more frequently that 1x daily non-insulin or 3x daily for insulin treated.  Please confirm that this pt has been evaluated within the last 6 month to assess their diabetes control and indicate reason for high testing frequency.  Thanks

## 2019-05-03 ENCOUNTER — MYC MEDICAL ADVICE (OUTPATIENT)
Dept: PHARMACY | Facility: CLINIC | Age: 54
End: 2019-05-03

## 2019-05-03 DIAGNOSIS — E11.9 TYPE 2 DIABETES MELLITUS WITHOUT COMPLICATION, WITHOUT LONG-TERM CURRENT USE OF INSULIN (H): ICD-10-CM

## 2019-05-03 NOTE — TELEPHONE ENCOUNTER
Requested Prescriptions   Pending Prescriptions Disp Refills     metFORMIN (GLUCOPHAGE-XR) 500 MG 24 hr tablet [Pharmacy Med Name: METFORMIN ER 500MG 24HR TABS]  Last Written Prescription Date:  10-5-18  Last Fill Quantity: 360 tab,  # refills: 0   Last office visit: 3/18/2019 with prescribing provider:  Duane Hopkins    Future Office Visit:   Next 5 appointments (look out 90 days)    May 14, 2019  2:30 PM CDT  SHORT with Tomasa Deal RPH  [facility] ([facility]) [address] [place] [address]  [phone]         360 tablet 0     Sig: TAKE 2 TABLETS BY MOUTH TWICE DAILY WITH MEALS       Biguanide Agents Passed - 5/3/2019 10:13 AM        Passed - Blood pressure less than 140/90 in past 6 months     BP Readings from Last 3 Encounters:   04/09/19 122/86   03/18/19 104/72   03/08/19 118/84           Passed - Patient has documented LDL within the past 12 mos.     Recent Labs   Lab Test 03/08/19  1030   *           Passed - Patient has had a Microalbumin in the past 15 mos.     Recent Labs   Lab Test 04/09/19  1341   MICROL <5   UMALCR Unable to calculate due to low value           Passed - Patient is age 10 or older        Passed - Patient has documented A1c within the specified period of time.     If HgbA1C is 8 or greater, it needs to be on file within the past 3 months.  If less than 8, must be on file within the past 6 months.     Recent Labs   Lab Test 03/08/19  1030   A1C 12.3*           Passed - Patient's CR is NOT>1.4 OR Patient's EGFR is NOT<45 within past 12 mos.     Recent Labs   Lab Test 03/08/19  1030   GFRESTIMATED >90   GFRESTBLACK >90     Recent Labs   Lab Test 03/08/19  1030   CR 0.51*           Passed - Patient does NOT have a diagnosis of CHF.        Passed - Medication is active on med list        Passed - Recent (6 mo) or future (30 days) visit within the authorizing provider's specialty     Patient had office visit in  "the last 6 months or has a visit in the next 30 days with authorizing provider or within the authorizing provider's specialty.  See \"Patient Info\" tab in inbasket, or \"Choose Columns\" in Meds & Orders section of the refill encounter.            "

## 2019-05-03 NOTE — TELEPHONE ENCOUNTER
5-2-19      mtm changed rx test strip and lancet directions to once daily in accordance with medicare rules.      Kylie --please call yousuf and explain the medicare rules since he's not on insulin can only test daily --have him alternate fasting one with 2 hrs.post main meal the next.    Thx.  Tomasa Deal Rph.  Medication Therapy Management Provider  [phone]

## 2019-05-03 NOTE — TELEPHONE ENCOUNTER
5-3-19        I just got the prescription yesterday due to the insurance issue on the other one. I was supposed to do a month but I see there there is an appointment the 14th. Should I reschedule for a month period?      Ys reschedule for 1 month from now.    Tomasa Deal [facility].  Medication Therapy Management Provider  [phone]

## 2019-05-04 NOTE — TELEPHONE ENCOUNTER
Routing refill request to provider for review/approval because:  appears to have compliance      Routing to HP REception - due for MTM visit please

## 2019-05-06 RX ORDER — METFORMIN HCL 500 MG
TABLET, EXTENDED RELEASE 24 HR ORAL
Qty: 120 TABLET | Refills: 0 | Status: SHIPPED | OUTPATIENT
Start: 2019-05-06 | End: 2019-07-09

## 2019-05-08 NOTE — TELEPHONE ENCOUNTER
Left message on voicemail to call back  Has appointment with orestes lopez 5/14  Needs to know info below from orestes Mcclendon RN

## 2019-05-09 NOTE — TELEPHONE ENCOUNTER
Pt notified and verbalized his understanding  mtm appointment changed to 6/4/19  Kylie Mcclendon RN

## 2019-06-07 ENCOUNTER — TELEPHONE (OUTPATIENT)
Dept: FAMILY MEDICINE | Facility: CLINIC | Age: 54
End: 2019-06-07

## 2019-06-07 DIAGNOSIS — E11.9 TYPE 2 DIABETES MELLITUS WITHOUT COMPLICATION, WITHOUT LONG-TERM CURRENT USE OF INSULIN (H): ICD-10-CM

## 2019-06-07 NOTE — TELEPHONE ENCOUNTER
Reason for Call:  Other prescription    Detailed comments: Pharmacy called asking if a new script for blood glucose (ONETOUCH ULTRA) test strip can be re-sent with a sig of 3x per day. It needs to be specific in order for insurance to cover.       Call taken on 6/7/2019 at 1:57 PM by Emmy Johnson

## 2019-06-17 NOTE — PROGRESS NOTES
".  SUBJECTIVE/OBJECTIVE:                           Corey Hooper is a 53 year old male coming in for a follow-up (4-9-19) visit for Medication Therapy Management.  He was referred to me from Dr. Duane Monroe.    He needs a new PCP.    TC--Use [facility] when billing.    Chief Complaint:  Here for DM f/up , last 3 years problem     He is eating low fat and sf foods, pop and twinkies in the past .           Personal Healthcare Goals: fix dm w/out insulin, gain 20+ lbs.     Allergies/ADRs: Reviewed in Epic  Tobacco: No tobacco use  Alcohol: Endorses a \"few too many\" beers lately  Caffeine: 1-2 sodas/day, 1-2 coffee's/day .   Activity: belongs to gym - goes 2-3 x month .   PMH: Reviewed in Epic    Medication Adherence/Access:  no issues reported    Diabetes:  Pt currently taking metformin er 500mg --2 tabs bid , glipizide 2 x10mgs qam, tradjenta 5mg./day . Pt is not experiencing side effects.  SMBG: one time daily, two times per week.   Ranges (patient reported): fasting in am - 300+, 2 hrs post supper - 350+  Patient is not experiencing hypoglycemia  Recent symptoms of high blood sugar? Anxious, depressed, lethargic  Eye exam: due  Foot exam: up to date  ACEi/ARB: No.   Urine Albumin: wnl  He lost 60 pounds 2 years ago and ever since then he has some sort of issue with his sugars. He would like to try one more month of dietary changes before trying insulin. He agrees to monitor his blood sugars daily during this trial. We discussed he looks more like a type-1 dm patient now --he needs pancreas testing of leisa and c-peptide. He declined today but will think about it next month .     Lab Results   Component Value Date    UMALCR Unable to calculate due to low value 04/09/2019      Aspirin: 1/4 tab of 325mg asa daily now.   Diet/Exercise: b-fast - frosted flakes and soy milk, snacks -apples, potato chips, lunch - skips --, dinner- eats frozen meals a lot , popcorn, drinks - pop     Lab Results   Component Value Date    A1C " "12.3 03/08/2019    A1C 11.8 10/05/2018    A1C 12.7 06/25/2018    A1C 14.4 03/26/2018        Schizo-affective disorder: Taking abilify 10mg./day --stable mental health wise --we discussed this drug can increase blood sugars but more important that he keep taking it for mental health help.       Hyperlipidemia: Current therapy includes : atorvastatin 40mg once daily.  Pt reports no significant myalgias or other side effects.  The 10-year ASCVD risk score (Yetino RAI JrCheryl, et al., 2013) is: 5.8%    Values used to calculate the score:      Age: 53 years      Sex: Male      Is Non- : No      Diabetic: Yes      Tobacco smoker: No      Systolic Blood Pressure: 120 mmHg      Is BP treated: No      HDL Cholesterol: 71 mg/dL      Total Cholesterol: 203 mg/dL     Recent Labs   Lab Test 03/08/19  1030 06/25/18  1054   CHOL 203* 240*   HDL 71 103   * 127*   TRIG 134 50         BP Readings from Last 1 Encounters:   06/18/19 120/72     Pulse Readings from Last 1 Encounters:   06/18/19 100     Wt Readings from Last 1 Encounters:   06/18/19 114 lb 9.6 oz (52 kg)     Ht Readings from Last 1 Encounters:   10/05/18 5' 2.5\" (1.588 m)     Estimated body mass index is 20.63 kg/m  as calculated from the following:    Height as of 10/5/18: 5' 2.5\" (1.588 m).    Weight as of this encounter: 114 lb 9.6 oz (52 kg).    Temp Readings from Last 1 Encounters:   03/18/19 97.7  F (36.5  C)           ASSESSMENT:                             Current medications were reviewed today.     Medication Adherence: excellent, no issues identified    Diabetes: Needs Improvement. Patient is not meeting A1c goal of < 7%  Self monitoring of blood glucose is not at goal of fasting  mg/dL and post prandial < 150 mg/dL. Pt would benefit from minimum SMBG: Check blood sugars fasting, and occasionally 2 hours after starting a meal.  Metformin :  stay on the same dose.  SFU (glipizide) :  stay on the same dose.  DPP4-(Tradjenta): Stay on " the same dose.  Increased exercise as tolerated.   Weight gain recommended--gave my 1 page food/diet handout --spent majority of visit educating patient that he needs to REDUCE carbs/sugars in diet to store bs and gain weight  Aspirin therapy is safe and appropriate. Aspirin therapy is indicated in this patient at dose of 81mg daily. Pt is now taking aspirin.     Patient may benefit from insulin therapy due to continued, uncontrolled blood sugars. He denies insulin at this time, stating he wants 1 more month to try diet/lifestyle changes before adding insulin. He will monitor blood sugars daily during this time period. His blood sugars remain uncontrolled despite aggressive oral medication therapy. A pancreas function test may be warranted at next visit if sugars remain high with dietary interventions.    Schizo-affective disorder:  Stable, is abilify hijacking his bs's?    Hyperlipidemia: Stable. Pt is on high intensity statin which is indicated based on 2013 ACC/AHA guidelines for lipid management.        PLAN:                            1. FYI - blood sugars are still quite high. Please recommit to 1 month of low carbohydrate low sugar diet. Check blood sugars 1-2 times daily if you can. Stay on your glipizide, tradjenta and metformin. We'll take a look in 30 days to see where you're at.    2. One month from now we can do a pancreas test on you if need be to see how much of your own insulin that you make. Eat a high carbohydrate meal a couple hours before your next appointment.     Next MT visit: July 17th at 10:30am. Please bring meter to visit.      I spent 30 minutes with this patient today. All changes were made via collaborative practice agreement with Dr. Bautista. A copy of the visit note was provided to the patient's primary care and referring provider.        The patient was given a summary of these recommendations as an after visit summary.     Tomasa Deal Rp.  Medication Therapy Management  Provider  [phone]  Brice Nielsen  Pharm[facility].

## 2019-06-18 ENCOUNTER — OFFICE VISIT (OUTPATIENT)
Dept: PHARMACY | Facility: CLINIC | Age: 54
End: 2019-06-18
Payer: COMMERCIAL

## 2019-06-18 VITALS
WEIGHT: 114.6 LBS | BODY MASS INDEX: 20.63 KG/M2 | DIASTOLIC BLOOD PRESSURE: 72 MMHG | HEART RATE: 100 BPM | SYSTOLIC BLOOD PRESSURE: 120 MMHG | OXYGEN SATURATION: 97 %

## 2019-06-18 DIAGNOSIS — E78.5 HYPERLIPIDEMIA LDL GOAL <100: ICD-10-CM

## 2019-06-18 DIAGNOSIS — E11.9 TYPE 2 DIABETES MELLITUS WITHOUT COMPLICATION, WITHOUT LONG-TERM CURRENT USE OF INSULIN (H): Primary | ICD-10-CM

## 2019-06-18 DIAGNOSIS — F25.9 SCHIZOPHRENIA, SCHIZO-AFFECTIVE (H): ICD-10-CM

## 2019-06-18 PROCEDURE — 99606 MTMS BY PHARM EST 15 MIN: CPT | Performed by: PHARMACIST

## 2019-06-18 PROCEDURE — 99607 MTMS BY PHARM ADDL 15 MIN: CPT | Performed by: PHARMACIST

## 2019-06-18 NOTE — PATIENT INSTRUCTIONS
Recommendations from today's MTM visit:                                                      1. FYI - blood sugars are still quite high. Please recommit to 1 month of low carbohydrate low sugar diet. Check blood sugars 1-2 times daily if you can. Stay on your glipizide , Tradenta and metformin. We'll take a look in 30 days to see where you're at.    2. One month from now we can do a pancreas test on you if need be to see how much of your own insulin that you make. Eat a high carbohydrate meal a couple hours before your next appointment.     Next MTM visit: July 17th at 10:30am. Please bring meter to visit.    To schedule another MTM appointment, please call the clinic directly or you may call the MTM scheduling line at [phone] or toll-free at [phone].     My Clinical Pharmacist's contact information:                                                      It was a pleasure talking with you today!  Please feel free to contact me with any questions or concerns you have.      Tomasa Deal [facility].  Medication Therapy Management Provider  [phone]  Brice Nielsen  Pharm[facility]D4.       You may receive a survey about the MTM services you received by email and/or US Mail.  I would appreciate your feedback to help me serve you better in the future. Your comments will be anonymous.

## 2019-06-19 ENCOUNTER — TELEPHONE (OUTPATIENT)
Dept: FAMILY MEDICINE | Facility: CLINIC | Age: 54
End: 2019-06-19

## 2019-07-03 ENCOUNTER — TELEPHONE (OUTPATIENT)
Dept: FAMILY MEDICINE | Facility: CLINIC | Age: 54
End: 2019-07-03

## 2019-07-03 NOTE — TELEPHONE ENCOUNTER
Reason for Call:  Form, our goal is to have forms completed with 72 hours, however, some forms may require a visit or additional information.    Type of letter, form or note:  medical    Who is the form from?: Patient    Where did the form come from: form was faxed in    What clinic location was the form placed at?: [facility]    Where the form was placed: yellow folder Box/Folder    What number is listed as a contact on the form?: [phone]       Additional comments: Forms were faxed and placed in yellow folder. The forms are for medication refill request with a providers signature on it. The pt was a SHW and has not established care with [facility]. Please Advise thank you    Call taken on 7/3/2019 at 1:15 PM by Lillie Rea

## 2019-07-08 DIAGNOSIS — E11.9 TYPE 2 DIABETES MELLITUS WITHOUT COMPLICATION, WITHOUT LONG-TERM CURRENT USE OF INSULIN (H): ICD-10-CM

## 2019-07-08 NOTE — TELEPHONE ENCOUNTER
Apparently forms had been faxed, no documentation. They are in Sia's faxed box.Pam Schaefer, NA/R

## 2019-07-09 DIAGNOSIS — E11.9 TYPE 2 DIABETES MELLITUS WITHOUT COMPLICATION, WITHOUT LONG-TERM CURRENT USE OF INSULIN (H): ICD-10-CM

## 2019-07-09 RX ORDER — GLIPIZIDE 10 MG/1
TABLET, FILM COATED, EXTENDED RELEASE ORAL
Qty: 180 TABLET | Refills: 1 | Status: SHIPPED | OUTPATIENT
Start: 2019-07-09 | End: 2020-05-04

## 2019-07-09 NOTE — TELEPHONE ENCOUNTER
Requested Prescriptions   Pending Prescriptions Disp Refills     metFORMIN (GLUCOPHAGE-XR) 500 MG 24 hr tablet  Last Written Prescription Date:  5-6-19  Last Fill Quantity: 120 tab,  # refills: 0   Last office visit: 3/18/2019 with prescribing provider:  Duane Hopkins    Future Office Visit:   Next 5 appointments (look out 90 days)    Jul 17, 2019 10:30 AM CDT  SHORT with Tomasa Deal RPH  [facility] ([facility]) [address] [place] [address]  [phone]       120 tablet 0       Biguanide Agents Failed - 7/9/2019  2:01 PM        Failed - Patient has documented A1c within the specified period of time.     If HgbA1C is 8 or greater, it needs to be on file within the past 3 months.  If less than 8, must be on file within the past 6 months.     Recent Labs   Lab Test 03/08/19  1030   A1C 12.3*           Passed - Blood pressure less than 140/90 in past 6 months     BP Readings from Last 3 Encounters:   06/18/19 120/72   04/09/19 122/86   03/18/19 104/72           Passed - Patient has documented LDL within the past 12 mos.     Recent Labs   Lab Test 03/08/19  1030   *           Passed - Patient has had a Microalbumin in the past 15 mos.     Recent Labs   Lab Test 04/09/19  1341   MICROL <5   UMALCR Unable to calculate due to low value           Passed - Patient is age 10 or older        Passed - Patient's CR is NOT>1.4 OR Patient's EGFR is NOT<45 within past 12 mos.     Recent Labs   Lab Test 03/08/19  1030   GFRESTIMATED >90   GFRESTBLACK >90     Recent Labs   Lab Test 03/08/19  1030   CR 0.51*           Passed - Patient does NOT have a diagnosis of CHF.        Passed - Medication is active on med list        Passed - Recent (6 mo) or future (30 days) visit within the authorizing provider's specialty     Patient had office visit in the last 6 months or has a visit in the next 30 days with authorizing provider or within the authorizing  "provider's specialty.  See \"Patient Info\" tab in inbasket, or \"Choose Columns\" in Meds & Orders section of the refill encounter.              "

## 2019-07-09 NOTE — TELEPHONE ENCOUNTER
"Requested Prescriptions   Pending Prescriptions Disp Refills     glipiZIDE (GLUCOTROL XL) 10 MG 24 hr tablet [Pharmacy Med Name: GLIPIZIDE ER 10MG TABLETS] 180 tablet 0     Sig: TAKE 2 TABLETS BY MOUTH EVERY DAY       Sulfonylurea Agents Failed - 7/8/2019 12:31 PM        Failed - Patient has documented A1c within the specified period of time.     If HgbA1C is 8 or greater, it needs to be on file within the past 3 months.  If less than 8, must be on file within the past 6 months.     Recent Labs   Lab Test 03/08/19  1030   A1C 12.3*             Failed - Patient has a recent creatinine (normal) within the past 12 mos.     Recent Labs   Lab Test 03/08/19  1030   CR 0.51*             Passed - Blood pressure less than 140/90 in past 6 months     BP Readings from Last 3 Encounters:   06/18/19 120/72   04/09/19 122/86   03/18/19 104/72                 Passed - Patient has documented LDL within the past 12 mos.     Recent Labs   Lab Test 03/08/19  1030   *             Passed - Patient has had a Microalbumin in the past 15 mos.     Recent Labs   Lab Test 04/09/19  1341   MICROL <5   UMALCR Unable to calculate due to low value             Passed - Medication is active on med list        Passed - Patient is age 18 or older        Passed - Recent (6 mo) or future (30 days) visit within the authorizing provider's specialty     Patient had office visit in the last 6 months or has a visit in the next 30 days with authorizing provider or within the authorizing provider's specialty.  See \"Patient Info\" tab in inbasket, or \"Choose Columns\" in Meds & Orders section of the refill encounter.                  "

## 2019-07-15 RX ORDER — METFORMIN HCL 500 MG
TABLET, EXTENDED RELEASE 24 HR ORAL
Qty: 30 TABLET | Refills: 0 | Status: SHIPPED | OUTPATIENT
Start: 2019-07-15 | End: 2019-07-25

## 2019-07-15 NOTE — TELEPHONE ENCOUNTER
Routing refill request to provider for review/approval because:  --Last order 5/6/19 was warren refill with reminder due for office visit.  --Patient has f/u appointment with MTM 7/17.  --Abnormal A1c and overdue for A1c.  --Patient may not be taking this medication as instructed but patient is following with MTM.    Last Office Visit: 3/18/2019     Future Office Visit:    Next 5 appointments (look out 90 days)    Jul 17, 2019 10:30 AM CDT  SHORT with Tomasa Deal RPH  [facility] ([facility]) [address] [place] [id number] [phone]         Lab Results   Component Value Date    A1C 12.3 03/08/2019    A1C 11.8 10/05/2018    A1C 12.7 06/25/2018    A1C 14.4 03/26/2018

## 2019-07-23 NOTE — PROGRESS NOTES
".  SUBJECTIVE/OBJECTIVE:                           Corey Hooper is a 53 year old male coming in for a follow-up (6-18-19) visit for Medication Therapy Management.  He was referred to me from Dr. Duane Monroe.    He needs a new PCP.  We discussed he will see dr. You lim.     TC--Use [facility] when billing.    Chief Complaint:  Here for DM f/up , last 3 years problem     He is eating low fat and sf foods, pop and twinkies in the past .   Also admits he stopped is atovastatin --why --cause last lipids values was good --but that is when he was on the drug!          Personal Healthcare Goals: fix dm w/out insulin, gain 20+ lbs.     Allergies/ADRs: Reviewed in Epic  Tobacco: No tobacco use  Alcohol: Endorses a \"few too many\" beers lately  Caffeine: 1-2 sodas/day, 1-2 coffee's/day .   Activity: belongs to gym - goes 2-3 x month .   PMH: Reviewed in Epic    Medication Adherence/Access:  no issues reported    Diabetes:  Pt currently taking metformin er 500mg --2 tabs bid , glipizide 2 x10mgs qam, tradjenta 5mg./day . Pt is not experiencing side effects.  SMBG: one time daily, two times per week.   Ranges (patient reported): fasting in am - 300+, 2 hrs post supper - 350+    At first numbers were high. Had 195 mg/dL earlier today. Lowest bs in 3 years.     He had been trying to drink more water to control hunger and sugary sweets. Also stopped buying sugary cereals.     He does not feel ready for insulin, he thinks his sugars have been coming down.         Patient is not experiencing hypoglycemia  Recent symptoms of high blood sugar? Anxious, depressed, lethargic  Eye exam: due  Foot exam: up to date  ACEi/ARB: No.   Urine Albumin: wnl  He lost 60 pounds 2 years ago and ever since then he has some sort of issue with his sugars. He would like to try one more month of dietary changes before trying insulin. He agrees to monitor his blood sugars daily during this trial. We discussed he looks more like a type-1 dm patient now " --he needs pancreas testing of leisa and c-peptide. He declined today but will think about it next month .     Lab Results   Component Value Date    UMALCR Unable to calculate due to low value 04/09/2019      Aspirin: 1/4 tab of 325mg asa daily now.   Diet/Exercise: b-fast - frosted flakes and soy milk, snacks -apples, potato chips, lunch - skips --, dinner- eats frozen meals a lot , popcorn, drinks - pop       Date FBG/ 2hours post Lunch/2hours post Dinner /2hours post    7/24 195 mg/dL 259 mg/dL     7/23  307 mg/dL 254 mg/dL    7/22 7/21  331 mg/dL     7/5   579 mg/dL    7/4       7/3  433 mg/dL         Lab Results   Component Value Date    A1C 12.3 03/08/2019    A1C 11.8 10/05/2018    A1C 12.7 06/25/2018    A1C 14.4 03/26/2018        Schizo-affective disorder: Taking abilify 10mg./day --stable mental health wise --we discussed this drug can increase blood sugars but more important that he keep taking it for mental health help.       Hyperlipidemia: Current therapy includes : atorvastatin 40mg once daily.  Pt reports no significant myalgias or other side effects.    Canceled the atorvastatin because he saw his lipid panel was a bit lower. He didn't want to burden with extra meds to manage. He hasn't taken the statin for a few weeks now.     He has not had a stroke or MI before.     The 10-year ASCVD risk score (Ye FREDI Jr., et al., 2013) is: 6.8%    Values used to calculate the score:      Age: 53 years      Sex: Male      Is Non- : No      Diabetic: Yes      Tobacco smoker: No      Systolic Blood Pressure: 132 mmHg      Is BP treated: No      HDL Cholesterol: 71 mg/dL      Total Cholesterol: 203 mg/dL      Recent Labs   Lab Test 03/08/19  1030 06/25/18  1054   CHOL 203* 240*   HDL 71 103   * 127*   TRIG 134 50           BP Readings from Last 1 Encounters:   07/24/19 132/80     Pulse Readings from Last 1 Encounters:   07/24/19 65     Wt Readings from Last 1 Encounters:   07/24/19  "122 lb 6.4 oz (55.5 kg)     Ht Readings from Last 1 Encounters:   10/05/18 5' 2.5\" (1.588 m)     Estimated body mass index is 22.03 kg/m  as calculated from the following:    Height as of 10/5/18: 5' 2.5\" (1.588 m).    Weight as of this encounter: 122 lb 6.4 oz (55.5 kg).    Temp Readings from Last 1 Encounters:   03/18/19 97.7  F (36.5  C)           ASSESSMENT:                             Current medications were reviewed today.     Medication Adherence: excellent, no issues identified    Diabetes: Needs Improvement. Patient is not meeting A1c goal of < 7%  Self monitoring of blood glucose is not at goal of fasting  mg/dL and post prandial < 150 mg/dL. Pt would benefit from minimum SMBG: Check blood sugars fasting, and occasionally 2 hours after starting a meal.  Metformin :  stay on the same dose.  SFU (glipizide) :  stay on the same dose.  DPP4-(Tradjenta): Stay on the same dose.  Increased exercise as tolerated.   Weight gain recommended--gave my 1 page food/diet handout --spent majority of visit educating patient that he needs to REDUCE carbs/sugars in diet to store bs and gain weight  Aspirin therapy is safe and appropriate. Aspirin therapy is indicated in this patient at dose of 81mg daily. Pt is now taking aspirin.     Patient may benefit from insulin therapy due to continued, uncontrolled blood sugars. He denies insulin at this time, stating he wants 1 more month to try diet/lifestyle changes before adding insulin. He will monitor blood sugars daily during this time period. His blood sugars remain uncontrolled despite aggressive oral medication therapy. A pancreas function test may be warranted at next visit if sugars remain high with dietary interventions.    Schizo-affective disorder:  Stable, is abilify hijacking his bs's?    Hyperlipidemia: Stable. Pt is on high intensity statin which is indicated based on 2013 ACC/AHA guidelines for lipid management.        PLAN:                              1. " Let's have you try concentrating for 1 month on as low a carbohydrate diet as tolerable. After a month if the sugars are still on the high end, we would likely consider a nightly insulin shot to control the blood sugars.     2. We can also have you try not taking the atorvastatin for a month.     3. At our next visit, we'll check your A1c, cholesterol, and pancreas function.     4. Call the clinic here to set up an appointment with Dr. You Cordova.     Next [facility] visit: Wednesday, August 21st at 11am. Please bring your meter to this visit.      I spent 30 minutes with this patient today. All changes were made via collaborative practice agreement with Dr. Bautista. A copy of the visit note was provided to the patient's primary care and referring provider.        The patient was given a summary of these recommendations as an after visit summary.     Tomasa Deal [facility].  Medication Therapy Management Provider  [phone]  Brice Nielsen  Pharm-D4.

## 2019-07-24 ENCOUNTER — OFFICE VISIT (OUTPATIENT)
Dept: PHARMACY | Facility: CLINIC | Age: 54
End: 2019-07-24
Payer: COMMERCIAL

## 2019-07-24 VITALS
BODY MASS INDEX: 22.03 KG/M2 | WEIGHT: 122.4 LBS | HEART RATE: 65 BPM | OXYGEN SATURATION: 98 % | DIASTOLIC BLOOD PRESSURE: 80 MMHG | SYSTOLIC BLOOD PRESSURE: 132 MMHG

## 2019-07-24 DIAGNOSIS — E11.9 TYPE 2 DIABETES MELLITUS WITHOUT COMPLICATION, WITHOUT LONG-TERM CURRENT USE OF INSULIN (H): Primary | ICD-10-CM

## 2019-07-24 DIAGNOSIS — E78.5 HYPERLIPIDEMIA LDL GOAL <100: ICD-10-CM

## 2019-07-24 DIAGNOSIS — F25.9 SCHIZOPHRENIA, SCHIZO-AFFECTIVE (H): ICD-10-CM

## 2019-07-24 PROCEDURE — 99607 MTMS BY PHARM ADDL 15 MIN: CPT | Performed by: PHARMACIST

## 2019-07-24 PROCEDURE — 99606 MTMS BY PHARM EST 15 MIN: CPT | Performed by: PHARMACIST

## 2019-07-24 NOTE — PATIENT INSTRUCTIONS
Recommendations from today's MTM visit:                                                      1. Let's have you try concentrating for 1 month on as low a carbohydrate diet as tolerable. After a month if the sugars are still on the high end, we would likely consider a nightly insulin shot to control the blood sugars.     2. We can also have you try not taking the atorvastatin for a month.     3. At our next visit, we'll check your A1c, cholesterol, and pancreas function.     4. Call the clinic here to set up an appointment with Dr. You Cordova.     Next MTM visit: Wednesday, August 21st at 11am. Please bring your meter to this visit.    To schedule another MTM appointment, please call the clinic directly or you may call the MTM scheduling line at [phone] or toll-free at [phone].     My Clinical Pharmacist's contact information:                                                      It was a pleasure talking with you today!  Please feel free to contact me with any questions or concerns you have.      Tomasa Deal Rph.  Medication Therapy Management Provider  [phone]  Brice Nielsen  [facility].     You may receive a survey about the MTM services you received by email and/or US Mail.  I would appreciate your feedback to help me serve you better in the future. Your comments will be anonymous.

## 2019-07-25 DIAGNOSIS — E11.9 TYPE 2 DIABETES MELLITUS WITHOUT COMPLICATION, WITHOUT LONG-TERM CURRENT USE OF INSULIN (H): ICD-10-CM

## 2019-07-25 RX ORDER — METFORMIN HCL 500 MG
TABLET, EXTENDED RELEASE 24 HR ORAL
Qty: 30 TABLET | Refills: 1 | Status: SHIPPED | OUTPATIENT
Start: 2019-07-25 | End: 2019-08-01

## 2019-07-29 ENCOUNTER — TELEPHONE (OUTPATIENT)
Dept: FAMILY MEDICINE | Facility: CLINIC | Age: 54
End: 2019-07-29

## 2019-07-29 NOTE — TELEPHONE ENCOUNTER
Pharmacy states they have not gotten form which was listed in charts 6.19. 19 it says it was sent multiple times and 2 a couple different numbers, said it should go to [phone]  Which is the number it was sent to on 7.2.19 please advise.

## 2019-07-29 NOTE — TELEPHONE ENCOUNTER
Form was refaxed to [employer] at [phone] & placed in Dr. Cordova's faxed bin.    Courtney URBANO  Vevay

## 2019-08-01 ENCOUNTER — TELEPHONE (OUTPATIENT)
Dept: FAMILY MEDICINE | Facility: CLINIC | Age: 54
End: 2019-08-01

## 2019-08-01 DIAGNOSIS — E11.9 TYPE 2 DIABETES MELLITUS WITHOUT COMPLICATION, WITHOUT LONG-TERM CURRENT USE OF INSULIN (H): ICD-10-CM

## 2019-08-01 RX ORDER — METFORMIN HCL 500 MG
TABLET, EXTENDED RELEASE 24 HR ORAL
Qty: 360 TABLET | Refills: 1 | Status: SHIPPED | OUTPATIENT
Start: 2019-08-01 | End: 2020-06-11

## 2019-08-01 NOTE — TELEPHONE ENCOUNTER
Reason for call:  Medication   If this is a refill request, has the caller requested the refill from the pharmacy already? Yes  Will the patient be using a [facility]? No  Name of the pharmacy and phone number for the current request: [facility] [phone]    Name of the medication requested: metformin     Other request: Patient states that he received a 8 days for his motformin. He would like at least 30 days. He also stated something about his test strips for his insurance and has questions on his test results.    Phone number to reach patient:  Cell number on file:    Telephone Information:   Mobile [phone]       Best Time:  anytime    Can we leave a detailed message on this number?  YES

## 2019-08-01 NOTE — TELEPHONE ENCOUNTER
Needs to re-establish with new provider, Tomasa can you sign off on this until he make a appt?  Thanks      Reception: please call and schedule a visit to re-establish care.  thanks

## 2019-08-02 NOTE — TELEPHONE ENCOUNTER
Patient informed of refill & advised to establish care with a new provider. Forms for test strips were filled out earlier this week by Dr. Cordova. Pharmacy was not able to receive it through fax after multiple tries. Writer got the ok from Deepthi to walk form over to [facility]. A copy was placed in Dr. Cordova's faxed bin.    Courtney Bertrand

## 2019-08-13 NOTE — TELEPHONE ENCOUNTER
Received form from [facility] for Diabetic testing supplied and placed in Dr. Cordova's signature folder     Tawnya Sylvester MA

## 2019-09-04 ENCOUNTER — OFFICE VISIT (OUTPATIENT)
Dept: PHARMACY | Facility: CLINIC | Age: 54
End: 2019-09-04
Payer: COMMERCIAL

## 2019-09-04 VITALS
OXYGEN SATURATION: 97 % | WEIGHT: 114.8 LBS | SYSTOLIC BLOOD PRESSURE: 138 MMHG | DIASTOLIC BLOOD PRESSURE: 90 MMHG | BODY MASS INDEX: 20.66 KG/M2 | HEART RATE: 58 BPM

## 2019-09-04 DIAGNOSIS — F25.9 SCHIZOPHRENIA, SCHIZO-AFFECTIVE (H): ICD-10-CM

## 2019-09-04 DIAGNOSIS — E78.5 HYPERLIPIDEMIA LDL GOAL <100: ICD-10-CM

## 2019-09-04 DIAGNOSIS — E11.9 TYPE 2 DIABETES MELLITUS WITHOUT COMPLICATION, WITHOUT LONG-TERM CURRENT USE OF INSULIN (H): Primary | ICD-10-CM

## 2019-09-04 DIAGNOSIS — E11.9 TYPE 2 DIABETES MELLITUS WITHOUT COMPLICATION, WITHOUT LONG-TERM CURRENT USE OF INSULIN (H): ICD-10-CM

## 2019-09-04 LAB — HBA1C MFR BLD: 11.1 % (ref 0–5.6)

## 2019-09-04 PROCEDURE — 86341 ISLET CELL ANTIBODY: CPT | Mod: 90 | Performed by: FAMILY MEDICINE

## 2019-09-04 PROCEDURE — 36415 COLL VENOUS BLD VENIPUNCTURE: CPT | Performed by: FAMILY MEDICINE

## 2019-09-04 PROCEDURE — 99000 SPECIMEN HANDLING OFFICE-LAB: CPT | Performed by: FAMILY MEDICINE

## 2019-09-04 PROCEDURE — 80061 LIPID PANEL: CPT | Performed by: FAMILY MEDICINE

## 2019-09-04 PROCEDURE — 83036 HEMOGLOBIN GLYCOSYLATED A1C: CPT | Performed by: FAMILY MEDICINE

## 2019-09-04 PROCEDURE — 99606 MTMS BY PHARM EST 15 MIN: CPT | Performed by: PHARMACIST

## 2019-09-04 PROCEDURE — 82947 ASSAY GLUCOSE BLOOD QUANT: CPT | Performed by: FAMILY MEDICINE

## 2019-09-04 PROCEDURE — 84681 ASSAY OF C-PEPTIDE: CPT | Performed by: FAMILY MEDICINE

## 2019-09-04 NOTE — Clinical Note
Don --I sent yousuf to lab today for pancreas testing to determine type-of dm and if he needs insulin ?Arminda

## 2019-09-04 NOTE — PROGRESS NOTES
".  SUBJECTIVE/OBJECTIVE:                           Corey Hooper is a 53 year old male coming in for a follow-up (7-24-19) visit for Medication Therapy Management.  He was referred to me from Dr. Duane Monroe.    He needs a new PCP.  We discussed he will see dr. You lim.     TC--Use [facility] when billing.    Chief Complaint:   Here for bs review but forgot his meter.         Personal Healthcare Goals: fix dm w/out insulin, gain 20+ lbs.     Allergies/ADRs: Reviewed in Epic  Tobacco: No tobacco use  Alcohol: Endorses a \"few too many\" beers lately  Caffeine: 1-2 sodas/day, 1-2 coffee's/day .   Activity: belongs to gym - goes 2-3 x month .   PMH: Reviewed in Epic    Medication Adherence/Access:  no issues reported    Diabetes:  Pt currently taking metformin er 500mg --2 tabs bid , glipizide 2 x10mgs qam, tradjenta 5mg./day . Pt is not experiencing side effects.  SMBG: one time daily, two times per week.   Ranges (patient reported): fasting in am - 300+, 2 hrs post supper - 350+    At first numbers were high. Had 195 mg/dL earlier today. Lowest bs in 3 years.     He had been trying to drink more water to control hunger and sugary sweets. Also stopped buying sugary cereals.     He does not feel ready for insulin, he thinks his sugars have been coming down.         Patient is not experiencing hypoglycemia  Recent symptoms of high blood sugar? Anxious, depressed, lethargic  Eye exam: due  Foot exam: up to date  ACEi/ARB: No.   Urine Albumin: wnl  He lost 60 pounds 2 years ago and ever since then he has some sort of issue with his sugars. He would like to try one more month of dietary changes before trying insulin. He agrees to monitor his blood sugars daily during this trial. We discussed he looks more like a type-1 dm patient now --he needs pancreas testing of leisa and c-peptide. He declined today but will think about it next month .     Lab Results   Component Value Date    UMALCR Unable to calculate due to low value " "04/09/2019      Aspirin: 1/4 tab of 325mg asa daily now.   Diet/Exercise: b-fast - frosted flakes and soy milk, snacks -apples, potato chips, lunch - skips --, dinner- eats frozen meals a lot , popcorn, drinks - pop       Date FBG/ 2hours post Lunch/2hours post Dinner /2hours post    7/24 195 mg/dL 259 mg/dL     7/23  307 mg/dL 254 mg/dL    7/22 7/21  331 mg/dL     7/5   579 mg/dL    7/4       7/3  433 mg/dL         Lab Results   Component Value Date    A1C 12.3 03/08/2019    A1C 11.8 10/05/2018    A1C 12.7 06/25/2018    A1C 14.4 03/26/2018        Schizo-affective disorder: Taking abilify 10mg./day --stable mental health wise --we discussed this drug can increase blood sugars but more important that he keep taking it for mental health help.       Hyperlipidemia: Current therapy includes : 1 month OFF- atorvastatin 40mg once daily.  Pt reports no significant myalgias or other side effects.    Canceled the atorvastatin because he saw his lipid panel was a bit lower. He didn't want to burden with extra meds to manage. He hasn't taken the statin for a few weeks now.     He has not had a stroke or MI before.     The 10-year ASCVD risk score (Yetino RAI Jr., et al., 2013) is: 6.8%    Values used to calculate the score:      Age: 53 years      Sex: Male      Is Non- : No      Diabetic: Yes      Tobacco smoker: No      Systolic Blood Pressure: 132 mmHg      Is BP treated: No      HDL Cholesterol: 71 mg/dL      Total Cholesterol: 203 mg/dL      Recent Labs   Lab Test 03/08/19  1030 06/25/18  1054   CHOL 203* 240*   HDL 71 103   * 127*   TRIG 134 50       Recheck non-fasting lipids today .     BP Readings from Last 1 Encounters:   09/04/19 (!) 138/90     Pulse Readings from Last 1 Encounters:   09/04/19 58     Wt Readings from Last 1 Encounters:   09/04/19 114 lb 12.8 oz (52.1 kg)     Ht Readings from Last 1 Encounters:   10/05/18 5' 2.5\" (1.588 m)     Estimated body mass index is 20.66 kg/m  " "as calculated from the following:    Height as of 10/5/18: 5' 2.5\" (1.588 m).    Weight as of this encounter: 114 lb 12.8 oz (52.1 kg).    Temp Readings from Last 1 Encounters:   03/18/19 97.7  F (36.5  C)           ASSESSMENT:                             Current medications were reviewed today.     Medication Adherence: excellent, no issues identified    Diabetes: Needs Improvement. Patient is not meeting A1c goal of < 7%  Self monitoring of blood glucose is not at goal of fasting  mg/dL and post prandial < 150 mg/dL. Pt would benefit from minimum SMBG: Check blood sugars fasting, and occasionally 2 hours after starting a meal.  Metformin :  stay on the same dose.  SFU (glipizide) :  stay on the same dose.  DPP4-(Tradjenta): Stay on the same dose.  Increased exercise as tolerated.   Weight gain recommended--gave my 1 page food/diet handout --spent majority of visit educating patient that he needs to REDUCE carbs/sugars in diet to store bs and gain weight  Aspirin therapy is safe and appropriate. Aspirin therapy is indicated in this patient at dose of 81mg daily. Pt is now taking aspirin.     mtm educated pt. We need pnacreas labs to day --he agreed --see plan for details on c-peptide, leisa , glucose and a1c today .       Schizo-affective disorder:  Stable, is abilify hijacking his bs's?    Hyperlipidemia: Needs Improvement. Pt is not on any intensity statin which is indicated based on 2019 ACC/AHA guidelines for lipid management.    He quit his statin 1 month ago- rechecking lipids to see if he needs it ?         PLAN:                              1. FYI--Lets recheck your cholesterol, A1c and 3 pancreas labs to verify what type of Diabetes that you have .    Lets re-schedule your appt,. To next week --se below.     It was great to speak with you today.  I value your experience and would be very thankful for your time with providing feedback on our clinic survey. You may receive a survey via email or text " message in the next few days.     Next MTM visit: see me 9-11-19 at 4pm --please bring blood sugar meter .         I spent 15 minutes with this patient today. All changes were made via collaborative practice agreement with Dr. Bautista. A copy of the visit note was provided to the patient's primary care and referring provider.        The patient was given a summary of these recommendations as an after visit summary.     Tomasa Deal Rph.  Medication Therapy Management Provider  [phone]

## 2019-09-05 LAB
C PEPTIDE SERPL-MCNC: 1.1 NG/ML (ref 0.9–6.9)
CHOLEST SERPL-MCNC: 223 MG/DL
GLUCOSE SERPL-MCNC: 336 MG/DL (ref 70–99)
HDLC SERPL-MCNC: 88 MG/DL
LDLC SERPL CALC-MCNC: 100 MG/DL
NONHDLC SERPL-MCNC: 135 MG/DL
TRIGL SERPL-MCNC: 177 MG/DL

## 2019-09-06 DIAGNOSIS — E11.9 TYPE 2 DIABETES MELLITUS WITHOUT COMPLICATION, WITHOUT LONG-TERM CURRENT USE OF INSULIN (H): ICD-10-CM

## 2019-09-06 NOTE — TELEPHONE ENCOUNTER
"Requested Prescriptions   Pending Prescriptions Disp Refills     TRADJENTA 5 MG TABS tablet [Pharmacy Med Name: TRADJENTA 5MG TABLETS]  Last Written Prescription Date:  4-26-19  Last Fill Quantity: 30 tab,  # refills: 3   Last office visit: 3/18/2019 with prescribing provider:  Duane Hopkins    Future Office Visit:   Next 5 appointments (look out 90 days)    Sep 11, 2019  4:00 PM CDT  [facility] [address] [place] [id number] [phone]       30 tablet 0     Sig: TAKE 1 TABLET(5 MG) BY MOUTH DAILY       DPP4 Inhibitors Protocol Failed - 9/6/2019  1:01 PM        Failed - Blood pressure less than 140/90 in past 6 months     BP Readings from Last 3 Encounters:   09/04/19 (!) 138/90   07/24/19 132/80   06/18/19 120/72           Failed - Normal serum creatinine in past 12 months     Recent Labs   Lab Test 03/08/19  1030   CR 0.51*           Passed - LDL on file in past 12 months     Recent Labs   Lab Test 09/04/19  1653   *           Passed - Microalbumin on file in past 12 months     Recent Labs   Lab Test 04/09/19  1341   MICROL <5   UMALCR Unable to calculate due to low value           Passed - HgbA1C in past 3 or 6 months     If HgbA1C is 8 or greater, it needs to be on file within the past 3 months.  If less than 8, must be on file within the past 6 months.     Recent Labs   Lab Test 09/04/19  1653   A1C 11.1*           Passed - Medication is active on med list        Passed - Patient is age 18 or older        Passed - Recent (6 mo) or future (30 days) visit within the authorizing provider's specialty     Patient had office visit in the last 6 months or has a visit in the next 30 days with authorizing provider.  See \"Patient Info\" tab in inbasket, or \"Choose Columns\" in Meds & Orders section of the refill encounter.          "

## 2019-09-07 LAB — GAD65 AB SER IA-ACNC: >250 IU/ML (ref 0–5)

## 2019-09-10 RX ORDER — LINAGLIPTIN 5 MG/1
TABLET, FILM COATED ORAL
Qty: 30 TABLET | Refills: 0 | OUTPATIENT
Start: 2019-09-10

## 2019-09-10 NOTE — TELEPHONE ENCOUNTER
Routing refill request to provider for review/approval because:  Labs out of range:  Creatinine  Last A1C was 11.1 on 9/4/19  Blood pressure above range

## 2019-09-16 NOTE — TELEPHONE ENCOUNTER
Spoke with pt- he thinks the blood sugar of 190 is great! He will make appointment with orestes when he gets back from  Vacation  Advised pt on the side effects of elevated sugars long term and that time is of essence  He verbalized his understanding  Kylie Mcclendon RN

## 2019-09-16 NOTE — TELEPHONE ENCOUNTER
Spoke with pt- discussed lab resutls and why the orals wont work as well for him(to the best of my ability) I did recommend that he make appointment to see orestes in the next few weeks(he is going on vacation) so that orestes could better explain the labs and their impact on the tradjenta working  tradjenta rx and refills sent in per pt request    He did say that he feels happy that his a1c is coming down-  Praised pt for his numbers going the right direction but advised pt on rationale of getting a1c closer to 8 asap  He wondered if there is a new oral insulin? he isn't wanting the needles- but he is open to discussing - but also wondering on the price of insulin-   Possible to call him?     Thanks!     Kylie Mcclendon RN

## 2019-09-16 NOTE — TELEPHONE ENCOUNTER
Left message on voicemail to call back and ask for triage-   Per KRISTIN-   [9/16/2019 11:33 AM] Kristin Deal:   please call yousuf --he canceled his visit with me -- i did pancreas testing on him --oral meds ineffective so i canceled his tradjenta rx -he needs insulin --see if you can schedule him to see me this week or next for insulin start . if he declines ok to refill his tradjenta but it wont help much . he's got high ERICK antibodies and low c-peptide --he really needs insulin !  let me know what he says -thanks -kristin

## 2019-11-04 NOTE — TELEPHONE ENCOUNTER
Form was refaxed to [employer] at [phone].    Courtney URBANO  Wil[place]       
Form wasn't received. Please re-send to [phone]. Thanks!  
[facility]  Please find this form and refax.  Thanks!     Kylie Mcclendon RN    
Received a diabetic detailed written order from [facility] to specify on testing frequency for test strips.     Tomasa ALFARO Completed form & had Dr. Yao sign since Dr. Anival Esparza is no longer with [facility]. Writer faxed from to [employer] at [phone] & a copy was placed in 's faxed bin.    Courtney Bertrand       
Rx faxed forms to [phone]    Tonia Mendes MA    
Normal rate, regular rhythm, normal S1, S2 heart sounds heard.

## 2019-11-07 ENCOUNTER — HEALTH MAINTENANCE LETTER (OUTPATIENT)
Age: 54
End: 2019-11-07

## 2020-01-21 ENCOUNTER — OFFICE VISIT (OUTPATIENT)
Dept: FAMILY MEDICINE | Facility: CLINIC | Age: 55
End: 2020-01-21
Payer: MEDICARE

## 2020-01-21 VITALS
TEMPERATURE: 97.9 F | RESPIRATION RATE: 18 BRPM | OXYGEN SATURATION: 98 % | SYSTOLIC BLOOD PRESSURE: 139 MMHG | HEIGHT: 63 IN | HEART RATE: 88 BPM | DIASTOLIC BLOOD PRESSURE: 89 MMHG | BODY MASS INDEX: 23.04 KG/M2 | WEIGHT: 130 LBS

## 2020-01-21 DIAGNOSIS — Z12.11 COLON CANCER SCREENING: ICD-10-CM

## 2020-01-21 DIAGNOSIS — E03.9 HYPOTHYROIDISM, UNSPECIFIED TYPE: ICD-10-CM

## 2020-01-21 DIAGNOSIS — E11.9 TYPE 2 DIABETES MELLITUS WITHOUT COMPLICATION, WITHOUT LONG-TERM CURRENT USE OF INSULIN (H): Primary | ICD-10-CM

## 2020-01-21 DIAGNOSIS — Z23 NEED FOR PROPHYLACTIC VACCINATION AND INOCULATION AGAINST INFLUENZA: ICD-10-CM

## 2020-01-21 LAB
ALBUMIN SERPL-MCNC: 3.9 G/DL (ref 3.4–5)
ALP SERPL-CCNC: 36 U/L (ref 40–150)
ALT SERPL W P-5'-P-CCNC: 35 U/L (ref 0–70)
ANION GAP SERPL CALCULATED.3IONS-SCNC: 9 MMOL/L (ref 3–14)
AST SERPL W P-5'-P-CCNC: 21 U/L (ref 0–45)
BILIRUB SERPL-MCNC: 0.7 MG/DL (ref 0.2–1.3)
BUN SERPL-MCNC: 8 MG/DL (ref 7–30)
CALCIUM SERPL-MCNC: 9.1 MG/DL (ref 8.5–10.1)
CHLORIDE SERPL-SCNC: 93 MMOL/L (ref 94–109)
CHOLEST SERPL-MCNC: 253 MG/DL
CO2 SERPL-SCNC: 29 MMOL/L (ref 20–32)
CREAT SERPL-MCNC: 0.61 MG/DL (ref 0.66–1.25)
GFR SERPL CREATININE-BSD FRML MDRD: >90 ML/MIN/{1.73_M2}
GLUCOSE SERPL-MCNC: 318 MG/DL (ref 70–99)
HBA1C MFR BLD: 11 % (ref 0–5.6)
HDLC SERPL-MCNC: 123 MG/DL
LDLC SERPL CALC-MCNC: 117 MG/DL
NONHDLC SERPL-MCNC: 130 MG/DL
POTASSIUM SERPL-SCNC: 3.8 MMOL/L (ref 3.4–5.3)
PROT SERPL-MCNC: 6.9 G/DL (ref 6.8–8.8)
SODIUM SERPL-SCNC: 131 MMOL/L (ref 133–144)
T4 FREE SERPL-MCNC: 0.64 NG/DL (ref 0.76–1.46)
TRIGL SERPL-MCNC: 67 MG/DL
TSH SERPL DL<=0.005 MIU/L-ACNC: 104.86 MU/L (ref 0.4–4)

## 2020-01-21 PROCEDURE — 80061 LIPID PANEL: CPT | Performed by: NURSE PRACTITIONER

## 2020-01-21 PROCEDURE — G0008 ADMIN INFLUENZA VIRUS VAC: HCPCS | Performed by: NURSE PRACTITIONER

## 2020-01-21 PROCEDURE — 90682 RIV4 VACC RECOMBINANT DNA IM: CPT | Performed by: NURSE PRACTITIONER

## 2020-01-21 PROCEDURE — 99214 OFFICE O/P EST MOD 30 MIN: CPT | Mod: 25 | Performed by: NURSE PRACTITIONER

## 2020-01-21 PROCEDURE — 36415 COLL VENOUS BLD VENIPUNCTURE: CPT | Performed by: NURSE PRACTITIONER

## 2020-01-21 PROCEDURE — 80053 COMPREHEN METABOLIC PANEL: CPT | Performed by: NURSE PRACTITIONER

## 2020-01-21 PROCEDURE — 84439 ASSAY OF FREE THYROXINE: CPT | Performed by: NURSE PRACTITIONER

## 2020-01-21 PROCEDURE — 83036 HEMOGLOBIN GLYCOSYLATED A1C: CPT | Performed by: NURSE PRACTITIONER

## 2020-01-21 PROCEDURE — 84443 ASSAY THYROID STIM HORMONE: CPT | Performed by: NURSE PRACTITIONER

## 2020-01-21 ASSESSMENT — ENCOUNTER SYMPTOMS
DIARRHEA: 0
EYE PAIN: 0
CHILLS: 1
NERVOUS/ANXIOUS: 0
DIZZINESS: 0
COUGH: 0
ABDOMINAL PAIN: 0
CONSTIPATION: 1
FEVER: 0
HEMATURIA: 0
HEMATOCHEZIA: 0

## 2020-01-21 ASSESSMENT — MIFFLIN-ST. JEOR: SCORE: 1316.87

## 2020-01-21 ASSESSMENT — ACTIVITIES OF DAILY LIVING (ADL): CURRENT_FUNCTION: NO ASSISTANCE NEEDED

## 2020-01-21 NOTE — PATIENT INSTRUCTIONS
Patient Education   Personalized Prevention Plan  You are due for the preventive services outlined below.  Your care team is available to assist you in scheduling these services.  If you have already completed any of these items, please share that information with your care team to update in your medical record.  Health Maintenance Due   Topic Date Due     Eye Exam  1965     HIV Screening  09/15/1980     Annual Wellness Visit  09/15/1983     Zoster (Shingles) Vaccine (1 of 2) 09/15/2015     Diabetic Foot Exam  03/26/2019     FIT Test  05/05/2019     Flu Vaccine (1) 09/01/2019     PHQ-2  01/01/2020

## 2020-01-21 NOTE — PROGRESS NOTES
SUBJECTIVE:   Corey Hooper is a 54 year old male who presents for medication check. Pt roomed by Tawnya CODY    Originally scheduled for a physical, but does not want to do the physical exam, just wants labs and refills.      Pt stopped taking Lipitor 4 months ago     Have you ever done Advance Care Planning? (For example, a Health Directive, POLST, or a discussion with a medical provider or your loved ones about your wishes): pt declines     Due for diabetes and thyroid and htn  labs  Reports he is taking his meds every day.    Stopped lipitor  Admits to eating LOTS of chocolate through the holidays.        Fall risk  Fallen 2 or more times in the past year?: No  Any fall with injury in the past year?: No    Cognitive Screening   1) Repeat 3 items (Leader, Season, Table)    2) Clock draw: NORMAL  3) 3 item recall: Recalls 3 objects  Results: 3 items recalled: COGNITIVE IMPAIRMENT LESS LIKELY    Mini-CogTM Copyright WAYNE Alfaro. Licensed by the author for use in [facility]; reprinted with permission (garrett@[email or web address]). All rights reserved.      Do you have sleep apnea, excessive snoring or daytime drowsiness?: no    Reviewed and updated as needed this visit by clinical staff  Tobacco  Allergies  Meds  Problems  Med Hx  Surg Hx  Fam Hx         Reviewed and updated as needed this visit by Provider  Tobacco  Allergies  Meds  Problems  Med Hx  Surg Hx  Fam Hx        Social History     Tobacco Use     Smoking status: Never Smoker     Smokeless tobacco: Current User     Types: Chew   Substance Use Topics     Alcohol use: No         Alcohol Use 1/21/2020   Prescreen: >3 drinks/day or >7 drinks/week? Not Applicable           Current providers sharing in care for this patient include:   Patient Care Team:  No Ref-Primary, Physician as PCP - Tomasa Gamboa RPH as Pharmacist (Pharmacist)  Anival Esparza, Giles Ge MD as Assigned PCP    The following health maintenance items are reviewed in  "Epic and correct as of today:  Health Maintenance   Topic Date Due     EYE EXAM  1965     HIV SCREENING  09/15/1980     MEDICARE ANNUAL WELLNESS VISIT  09/15/1983     ZOSTER IMMUNIZATION (1 of 2) 09/15/2015     DIABETIC FOOT EXAM  03/26/2019     FIT  05/05/2019     INFLUENZA VACCINE (1) 09/01/2019     PHQ-2  01/01/2020     A1C  03/04/2020     BMP  03/08/2020     MICROALBUMIN  04/09/2020     LIPID  09/04/2020     TSH W/FREE T4 REFLEX  03/08/2021     DTAP/TDAP/TD IMMUNIZATION (2 - Td) 08/08/2021     HEPATITIS C SCREENING  Completed     PNEUMOCOCCAL IMMUNIZATION 19-64 MEDIUM RISK  Completed     IPV IMMUNIZATION  Aged Out     MENINGITIS IMMUNIZATION  Aged Out       Review of Systems   Constitutional: Positive for chills. Negative for fever.   HENT: Negative for congestion and ear pain.    Eyes: Negative for pain.   Respiratory: Negative for cough.    Cardiovascular: Negative for chest pain.   Gastrointestinal: Positive for constipation. Negative for abdominal pain, diarrhea and hematochezia.   Genitourinary: Negative for hematuria.   Neurological: Negative for dizziness.   Psychiatric/Behavioral: The patient is not nervous/anxious.          OBJECTIVE:   /89   Pulse 88   Temp 97.9  F (36.6  C) (Oral)   Resp 18   Ht 1.588 m (5' 2.5\")   Wt 59 kg (130 lb)   SpO2 98%   BMI 23.40 kg/m   Estimated body mass index is 23.4 kg/m  as calculated from the following:    Height as of this encounter: 1.588 m (5' 2.5\").    Weight as of this encounter: 59 kg (130 lb).  Physical Exam  GENERAL: healthy, alert and no distress  EYES: Eyes grossly normal to inspection, PERRL and conjunctivae and sclerae normal  NECK: no adenopathy, no asymmetry, masses, or scars and thyroid normal to palpation  RESP: lungs clear to auscultation - no rales, rhonchi or wheezes  CV: regular rate and rhythm, normal S1 S2, no S3 or S4, no murmur, click or rub, no peripheral edema and peripheral pulses strong  MS: no gross musculoskeletal defects " "noted, no edema  SKIN: no suspicious lesions or rashes      ASSESSMENT / PLAN:       ICD-10-CM    1. Type 2 diabetes mellitus without complication, without long-term current use of insulin (H) E11.9 Hemoglobin A1c     Lipid panel reflex to direct LDL Fasting     Comprehensive metabolic panel   2. Hypothyroidism, unspecified type E03.9 TSH with free T4 reflex   3. Need for prophylactic vaccination and inoculation against influenza Z23 INFLUENZA QUAD, RECOMBINANT, P-FREE (RIV4) (FLUBLOCK) [44914]     Vaccine Administration, Initial [39086]   4. Colon cancer screening Z12.11 Fecal colorectal cancer screen (FIT)     Physical deferred at pt request.      Labs pending.    Pt reports he was told he did not have to take the lipitor.   Lipids pending  Discussed goal for LDL below 70 with diabetes.    Pt thinks A1c will be high, plans to work on diet and cutting sweets, not interested in any medication changes at this time.      adament that he is taking his medications as prescribed despite the lapse in refills per his medical record.     Advised to f/u in 2-3 months advised NO REFILLS will be granted if his labs are NOT AT GOAL.  If his A1c is greater than 8 and LDL above 100, advised will make medications changes in 2-3 mnths if he is not able to make lifestyle changes in the interim.      COUNSELING:    Estimated body mass index is 23.4 kg/m  as calculated from the following:    Height as of this encounter: 1.588 m (5' 2.5\").    Weight as of this encounter: 59 kg (130 lb).       reports that he has never smoked. His smokeless tobacco use includes chew.    Appropriate preventive services were discussed with this patient, including applicable screening as appropriate for cardiovascular disease, diabetes, osteopenia/osteoporosis, and glaucoma.  As appropriate for age/gender, discussed screening for colorectal cancer, prostate cancer, breast cancer, and cervical cancer. Checklist reviewing preventive services available has " been given to the patient.      Counseling Resources:  ATP IV Guidelines  Pooled Cohorts Equation Calculator  Breast Cancer Risk Calculator  FRAX Risk Assessment  ICSI Preventive Guidelines  Dietary Guidelines for Americans, 2010  USDA's MyPlate  ASA Prophylaxis  Lung CA Screening    ANITA Pedro CNP  [facility]    Identified Health Risks:

## 2020-01-23 ENCOUNTER — TELEPHONE (OUTPATIENT)
Dept: FAMILY MEDICINE | Facility: CLINIC | Age: 55
End: 2020-01-23

## 2020-01-23 NOTE — TELEPHONE ENCOUNTER
----- Message from ANITA Pedro CNP sent at 1/22/2020  7:23 PM CST -----  Please have him come in for repeat labs.  TSH was quite high.

## 2020-01-23 NOTE — TELEPHONE ENCOUNTER
Spoke with pt directly.   He is scheduled for 1/27 for repeat TSH lab only visit.     Closing encounter.       Emmy HERNADEZ     [facility]

## 2020-01-27 DIAGNOSIS — E11.9 TYPE 2 DIABETES MELLITUS WITHOUT COMPLICATION, WITHOUT LONG-TERM CURRENT USE OF INSULIN (H): ICD-10-CM

## 2020-01-27 NOTE — TELEPHONE ENCOUNTER
"Requested Prescriptions   Pending Prescriptions Disp Refills     TRADJENTA 5 MG TABS tablet [Pharmacy Med Name: TRADJENTA 5MG TABLETS]  Last Written Prescription Date:  9-16-19  Last Fill Quantity: 30 tab,  # refills: 3   Last office visit: 1/21/2020 with prescribing provider:  Edith Wood   Future Office Visit:   Next 5 appointments (look out 90 days)    Feb 04, 2020 11:00 AM CST  [facility]Chart Short with ANITA Pedro CNP  [facility] [address] [place] [address]  [phone]       30 tablet 3     Sig: TAKE 1 TABLET(5 MG) BY MOUTH DAILY       DPP4 Inhibitors Protocol Failed - 1/27/2020  1:13 PM        Failed - Normal serum creatinine in past 12 months     Recent Labs   Lab Test 01/21/20  1234   CR 0.61*           Passed - Blood pressure less than 140/90 in past 6 months     BP Readings from Last 3 Encounters:   01/21/20 139/89   09/04/19 (!) 138/90   07/24/19 132/80           Passed - LDL on file in past 12 months     Recent Labs   Lab Test 01/21/20  1234   *           Passed - Microalbumin on file in past 12 months     Recent Labs   Lab Test 04/09/19  1341   MICROL <5   UMALCR Unable to calculate due to low value           Passed - HgbA1C in past 3 or 6 months     If HgbA1C is 8 or greater, it needs to be on file within the past 3 months.  If less than 8, must be on file within the past 6 months.     Recent Labs   Lab Test 01/21/20  1234   A1C 11.0*           Passed - Medication is active on med list        Passed - Patient is age 18 or older        Passed - Recent (6 mo) or future (30 days) visit within the authorizing provider's specialty     Patient had office visit in the last 6 months or has a visit in the next 30 days with authorizing provider.  See \"Patient Info\" tab in inbasket, or \"Choose Columns\" in Meds & Orders section of the refill encounter.          "

## 2020-02-02 RX ORDER — LINAGLIPTIN 5 MG/1
TABLET, FILM COATED ORAL
Qty: 30 TABLET | Refills: 1 | Status: SHIPPED | OUTPATIENT
Start: 2020-02-02 | End: 2020-06-11

## 2020-02-04 ENCOUNTER — OFFICE VISIT (OUTPATIENT)
Dept: FAMILY MEDICINE | Facility: CLINIC | Age: 55
End: 2020-02-04
Payer: MEDICARE

## 2020-02-04 VITALS
OXYGEN SATURATION: 100 % | RESPIRATION RATE: 16 BRPM | TEMPERATURE: 98.1 F | SYSTOLIC BLOOD PRESSURE: 139 MMHG | WEIGHT: 123 LBS | HEART RATE: 92 BPM | DIASTOLIC BLOOD PRESSURE: 83 MMHG | BODY MASS INDEX: 22.14 KG/M2

## 2020-02-04 DIAGNOSIS — E03.9 HYPOTHYROIDISM, UNSPECIFIED TYPE: Primary | ICD-10-CM

## 2020-02-04 DIAGNOSIS — E06.9 THYROIDITIS: ICD-10-CM

## 2020-02-04 DIAGNOSIS — Z11.4 ENCOUNTER FOR SCREENING FOR HIV: ICD-10-CM

## 2020-02-04 PROCEDURE — 87389 HIV-1 AG W/HIV-1&-2 AB AG IA: CPT | Performed by: NURSE PRACTITIONER

## 2020-02-04 PROCEDURE — 84443 ASSAY THYROID STIM HORMONE: CPT | Performed by: NURSE PRACTITIONER

## 2020-02-04 PROCEDURE — 36415 COLL VENOUS BLD VENIPUNCTURE: CPT | Performed by: NURSE PRACTITIONER

## 2020-02-04 PROCEDURE — 84439 ASSAY OF FREE THYROXINE: CPT | Performed by: NURSE PRACTITIONER

## 2020-02-04 PROCEDURE — 99213 OFFICE O/P EST LOW 20 MIN: CPT | Performed by: NURSE PRACTITIONER

## 2020-02-04 NOTE — PROGRESS NOTES
Subjective     Corey Hooper is a 54 year old male who presents to clinic today for the following health issues:    HPI   Pt states he is here to have thyroid lab work redone as well as a refill.  Pt insistent he has been taking his medications as ordered.  His most recent TSH trend  TSH   Date Value Ref Range Status      Final   01/21/2020 104.86 (H) 0.40 - 4.00 mU/L Final   03/08/2019 22.17 (H) 0.40 - 4.00 mU/L Final   10/05/2018 28.44 (H) 0.40 - 4.00 mU/L Final   06/25/2018 0.91 0.40 - 4.00 mU/L Final     Plan to recheck TSH today.      Reviewed and updated as needed this visit by Provider  Tobacco  Allergies  Meds  Problems  Med Hx  Surg Hx  Fam Hx         Review of Systems   ROS COMP: Constitutional, HEENT, cardiovascular, pulmonary, GI, , musculoskeletal, neuro, skin, endocrine and psych systems are negative, except as otherwise noted.      Objective    /83   Pulse 92   Temp 98.1  F (36.7  C) (Oral)   Resp 16   Wt 55.8 kg (123 lb)   SpO2 100%   BMI 22.14 kg/m    Body mass index is 22.14 kg/m .  Physical Exam   GENERAL: healthy, alert and no distress  EYES: Eyes grossly normal to inspection, PERRL and conjunctivae and sclerae normal  HENT: ear canals and TM's normal, nose and mouth without ulcers or lesions  NECK: no adenopathy, no asymmetry, masses, or scars and thyroid normal to palpation  RESP: lungs clear to auscultation - no rales, rhonchi or wheezes  CV: regular rate and rhythm, normal S1 S2, no S3 or S4, no murmur, click or rub, no peripheral edema and peripheral pulses strong  MS: no gross musculoskeletal defects noted, no edema  SKIN: no suspicious lesions or rashes  PSYCH: mentation appears normal, affect normal/bright    Diagnostic Test Results:  Labs reviewed in Epic  Results for orders placed or performed in visit on 02/04/20   HIV Screening     Status: None   Result Value Ref Range    HIV Antigen Antibody Combo Nonreactive NR^Nonreactive       TSH with free T4 reflex      Status: Abnormal   Result Value Ref Range    TSH 30.58 (H) 0.40 - 4.00 mU/L   T4 free     Status: None   Result Value Ref Range    T4 Free 1.14 0.76 - 1.46 ng/dL           Assessment & Plan       ICD-10-CM    1. Hypothyroidism, unspecified type E03.9 TSH with free T4 reflex     T4 free     T4 free   2. Type 2 diabetes mellitus without complication, without long-term current use of insulin (H) E11.9    3. Encounter for screening for HIV Z11.4 HIV Screening   4. Thyroiditis E06.9 levothyroxine (SYNTHROID/LEVOTHROID) 175 MCG tablet   TSH improving.  Refilled at the same dose.  Plan to recheck in 2-3 months   Advised will NOT refill without an office visit and f/u labs.         Tobacco Cessation:   reports that he has never smoked. His smokeless tobacco use includes chew.        No follow-ups on file.    ANITA Pedro CNP  [facility]

## 2020-02-05 LAB
HIV 1+2 AB+HIV1 P24 AG SERPL QL IA: NONREACTIVE
T4 FREE SERPL-MCNC: 1.14 NG/DL (ref 0.76–1.46)
TSH SERPL DL<=0.005 MIU/L-ACNC: 30.58 MU/L (ref 0.4–4)

## 2020-02-12 RX ORDER — LEVOTHYROXINE SODIUM 175 UG/1
175 TABLET ORAL DAILY
Qty: 90 TABLET | Refills: 0 | Status: SHIPPED | OUTPATIENT
Start: 2020-02-12 | End: 2020-06-11

## 2020-02-17 ENCOUNTER — HEALTH MAINTENANCE LETTER (OUTPATIENT)
Age: 55
End: 2020-02-17

## 2020-05-20 DIAGNOSIS — E06.9 THYROIDITIS: ICD-10-CM

## 2020-05-25 NOTE — TELEPHONE ENCOUNTER
Routing refill request to provider for review/approval because:  Labs out of range:  TSH was > 30 at last check  Last Written Prescription Date:  2/12/2020  Last Fill Quantity: 90,  # refills: 0   Last office visit: 2/4/2020 with prescribing provider:     Future Office Visit:

## 2020-05-26 RX ORDER — LEVOTHYROXINE SODIUM 175 UG/1
TABLET ORAL
Qty: 90 TABLET | Refills: 0 | OUTPATIENT
Start: 2020-05-26

## 2020-06-09 ENCOUNTER — NURSE TRIAGE (OUTPATIENT)
Dept: NURSING | Facility: CLINIC | Age: 55
End: 2020-06-09

## 2020-06-09 NOTE — TELEPHONE ENCOUNTER
"Patient called requesting to talk to the doctor about changing his medication. Patient did not want to answer questions regarding his medications. He was not in any distress.   Patient refused to answer any questions and just wanted to talk with his doctor. Dr. Wood at the [facility] was identified as the doctor he wanted to talk to regarding his medications.    contacted and able to schedule a phone visit with the doctor.    Reason for Disposition    Requesting regular office appointment    Answer Assessment - Initial Assessment Questions  1. REASON FOR CALL or QUESTION: \"What is your reason for calling today?\" or \"How can I best help you?\" or \"What question do you have that I can help answer?\"      I want to talk to the doctor about changing my medication.    Protocols used: INFORMATION ONLY CALL-SANDER-FRAN Lafleur RN on 6/9/2020 at 11:12 AM    "

## 2020-06-11 ENCOUNTER — VIRTUAL VISIT (OUTPATIENT)
Dept: FAMILY MEDICINE | Facility: CLINIC | Age: 55
End: 2020-06-11
Payer: MEDICARE

## 2020-06-11 DIAGNOSIS — E11.9 TYPE 2 DIABETES MELLITUS WITHOUT COMPLICATION, WITHOUT LONG-TERM CURRENT USE OF INSULIN (H): ICD-10-CM

## 2020-06-11 DIAGNOSIS — E06.9 THYROIDITIS: ICD-10-CM

## 2020-06-11 DIAGNOSIS — Z12.11 SPECIAL SCREENING FOR MALIGNANT NEOPLASMS, COLON: Primary | ICD-10-CM

## 2020-06-11 PROCEDURE — 99441 ZZC PHYSICIAN TELEPHONE EVALUATION 5-10 MIN: CPT | Performed by: NURSE PRACTITIONER

## 2020-06-11 RX ORDER — METFORMIN HCL 500 MG
TABLET, EXTENDED RELEASE 24 HR ORAL
Qty: 360 TABLET | Refills: 0 | Status: SHIPPED | OUTPATIENT
Start: 2020-06-11 | End: 2020-12-18

## 2020-06-11 RX ORDER — GLIPIZIDE 10 MG/1
TABLET, FILM COATED, EXTENDED RELEASE ORAL
Qty: 180 TABLET | Refills: 0 | Status: SHIPPED | OUTPATIENT
Start: 2020-06-11

## 2020-06-11 RX ORDER — LINAGLIPTIN 5 MG/1
TABLET, FILM COATED ORAL
Qty: 90 TABLET | Refills: 0 | Status: SHIPPED | OUTPATIENT
Start: 2020-06-11 | End: 2021-01-21

## 2020-06-11 RX ORDER — LEVOTHYROXINE SODIUM 175 UG/1
175 TABLET ORAL DAILY
Qty: 90 TABLET | Refills: 0 | Status: SHIPPED | OUTPATIENT
Start: 2020-06-11

## 2020-06-11 NOTE — PROGRESS NOTES
"Corey Hooper is a 54 year old male who is being evaluated via a billable telephone visit.      The patient has been notified of following:     \"This telephone visit will be conducted via a call between you and your physician/provider. We have found that certain health care needs can be provided without the need for a physical exam.  This service lets us provide the care you need with a short phone conversation.  If a prescription is necessary we can send it directly to your pharmacy.  If lab work is needed we can place an order for that and you can then stop by our lab to have the test done at a later time.    Telephone visits are billed at different rates depending on your insurance coverage. During this emergency period, for some insurers they may be billed the same as an in-person visit.  Please reach out to your insurance provider with any questions.    If during the course of the call the physician/provider feels a telephone visit is not appropriate, you will not be charged for this service.\"    Patient has given verbal consent for Telephone visit?  Yes    What phone number would you like to be contacted at? [phone] (M)    How would you like to obtain your AVS? Alina Lima     Corey Hooper is a 54 year old male who presents via phone visit today for the following health issues:    HPI  Diabetes Follow-up    How often are you checking your blood sugar? One time daily  What time of day are you checking your blood sugars (select all that apply)?  late morning   Have you had any blood sugars above 200?  No  Have you had any blood sugars below 70?  No    What symptoms do you notice when your blood sugar is low?  None    What concerns do you have today about your diabetes? None     Do you have any of these symptoms? (Select all that apply)  No numbness or tingling in feet.  No redness, sores or blisters on feet.  No complaints of excessive thirst.  No reports of blurry vision.  No significant changes " to weight.    Have you had a diabetic eye exam in the last 12 months? No        BP Readings from Last 2 Encounters:   02/04/20 139/83   01/21/20 139/89     Hemoglobin A1C (%)   Date Value   01/21/2020 11.0 (H)   09/04/2019 11.1 (H)     LDL Cholesterol Calculated (mg/dL)   Date Value   01/21/2020 117 (H)   09/04/2019 100 (H)         Hypothyroidism Follow-up      Since last visit, patient describes the following symptoms: Weight stable, no hair loss, no skin changes, no constipation, no loose stools       How many servings of fruits and vegetables do you eat daily?  1-2    On average, how many sweetened beverages do you drink each day (Examples: soda, juice, sweet tea, etc.  Do NOT count diet or artificially sweetened beverages)?   1    How many days per week do you exercise enough to make your heart beat faster? 4    How many minutes a day do you exercise enough to make your heart beat faster? 30 - 60    How many days per week do you miss taking your medication? 0    Due for labs  Needs refills  Has not missed any doses.        Reviewed and updated as needed this visit by Provider  Tobacco  Allergies  Meds  Problems  Med Hx  Surg Hx  Fam Hx         Review of Systems   Constitutional, HEENT, cardiovascular, pulmonary, GI, , musculoskeletal, neuro, skin, endocrine and psych systems are negative, except as otherwise noted.       Objective   Reported vitals:  There were no vitals taken for this visit.   healthy, alert and no distress  PSYCH: Alert and oriented times 3; coherent speech, normal   rate and volume, able to articulate logical thoughts, able   to abstract reason, no tangential thoughts, no hallucinations   or delusions  His affect is normal  RESP: No cough, no audible wheezing, able to talk in full sentences  Remainder of exam unable to be completed due to telephone visits            Assessment/Plan:  1. Type 2 diabetes mellitus without complication, without long-term current use of insulin  (H)  Medications refilled, plans to come into the clinic today and get labs.    Plan pending lab results  F/u in the clinic in 3 months or sooner if labs NOT at goal.    - Lipid panel reflex to direct LDL Fasting; Future  - Hemoglobin A1c; Future  - TSH with free T4 reflex; Future  - Comprehensive metabolic panel; Future  - Albumin Random Urine Quantitative with Creat Ratio; Future  - glipiZIDE (GLUCOTROL XL) 10 MG 24 hr tablet; TAKE 2 TABLETS BY MOUTH EVERY DAY  Dispense: 180 tablet; Refill: 0  - metFORMIN (GLUCOPHAGE-XR) 500 MG 24 hr tablet; TAKE 2 TABLETS BY MOUTH TWICE DAILY WITH MEALS  Dispense: 360 tablet; Refill: 0  - linagliptin (TRADJENTA) 5 MG TABS tablet; TAKE 1 TABLET(5 MG) BY MOUTH DAILY  Dispense: 90 tablet; Refill: 0    2. Thyroiditis  TSH and T4 pending.    Has not missed any doses.       - TSH with free T4 reflex; Future  - levothyroxine (SYNTHROID/LEVOTHROID) 175 MCG tablet; Take 1 tablet (175 mcg) by mouth daily  Dispense: 90 tablet; Refill: 0    3. Special screening for malignant neoplasms, colon    - Fecal colorectal cancer screen (FIT); Future    No follow-ups on file.      Phone call duration:  9 minutes    ANITA Pedro CNP

## 2020-06-17 DIAGNOSIS — E06.9 THYROIDITIS: ICD-10-CM

## 2020-06-17 DIAGNOSIS — E11.9 TYPE 2 DIABETES MELLITUS WITHOUT COMPLICATION, WITHOUT LONG-TERM CURRENT USE OF INSULIN (H): ICD-10-CM

## 2020-06-17 LAB — HBA1C MFR BLD: 12.4 % (ref 0–5.6)

## 2020-06-17 PROCEDURE — 84443 ASSAY THYROID STIM HORMONE: CPT | Performed by: NURSE PRACTITIONER

## 2020-06-17 PROCEDURE — 83036 HEMOGLOBIN GLYCOSYLATED A1C: CPT | Performed by: NURSE PRACTITIONER

## 2020-06-17 PROCEDURE — 36415 COLL VENOUS BLD VENIPUNCTURE: CPT | Performed by: NURSE PRACTITIONER

## 2020-06-17 PROCEDURE — 80061 LIPID PANEL: CPT | Performed by: NURSE PRACTITIONER

## 2020-06-17 PROCEDURE — 84439 ASSAY OF FREE THYROXINE: CPT | Performed by: NURSE PRACTITIONER

## 2020-06-17 PROCEDURE — 82043 UR ALBUMIN QUANTITATIVE: CPT | Performed by: NURSE PRACTITIONER

## 2020-06-17 PROCEDURE — 80053 COMPREHEN METABOLIC PANEL: CPT | Performed by: NURSE PRACTITIONER

## 2020-06-17 NOTE — RESULT ENCOUNTER NOTE
Corey,    Your A1C was very elevated at 12.4%.  Edith Wood will follow up with you regarding next steps to manage your diabetes.    -Yazmin Mar CNP (covering for Edith Wood who is currently out of the office)

## 2020-06-18 LAB
ALBUMIN SERPL-MCNC: 4 G/DL (ref 3.4–5)
ALP SERPL-CCNC: 32 U/L (ref 40–150)
ALT SERPL W P-5'-P-CCNC: 98 U/L (ref 0–70)
ANION GAP SERPL CALCULATED.3IONS-SCNC: 6 MMOL/L (ref 3–14)
AST SERPL W P-5'-P-CCNC: 76 U/L (ref 0–45)
BILIRUB SERPL-MCNC: 0.7 MG/DL (ref 0.2–1.3)
BUN SERPL-MCNC: 11 MG/DL (ref 7–30)
CALCIUM SERPL-MCNC: 8.6 MG/DL (ref 8.5–10.1)
CHLORIDE SERPL-SCNC: 90 MMOL/L (ref 94–109)
CHOLEST SERPL-MCNC: 298 MG/DL
CO2 SERPL-SCNC: 32 MMOL/L (ref 20–32)
CREAT SERPL-MCNC: 0.72 MG/DL (ref 0.66–1.25)
CREAT UR-MCNC: 183 MG/DL
GFR SERPL CREATININE-BSD FRML MDRD: >90 ML/MIN/{1.73_M2}
GLUCOSE SERPL-MCNC: 284 MG/DL (ref 70–99)
HDLC SERPL-MCNC: 99 MG/DL
LDLC SERPL CALC-MCNC: 186 MG/DL
MICROALBUMIN UR-MCNC: 76 MG/L
MICROALBUMIN/CREAT UR: 41.48 MG/G CR (ref 0–17)
NONHDLC SERPL-MCNC: 199 MG/DL
POTASSIUM SERPL-SCNC: 4.2 MMOL/L (ref 3.4–5.3)
PROT SERPL-MCNC: 7.1 G/DL (ref 6.8–8.8)
SODIUM SERPL-SCNC: 128 MMOL/L (ref 133–144)
T4 FREE SERPL-MCNC: 0.59 NG/DL (ref 0.76–1.46)
TRIGL SERPL-MCNC: 63 MG/DL
TSH SERPL DL<=0.005 MIU/L-ACNC: 150.47 MU/L (ref 0.4–4)

## 2020-07-10 DIAGNOSIS — Z12.11 SPECIAL SCREENING FOR MALIGNANT NEOPLASMS, COLON: ICD-10-CM

## 2020-07-10 PROCEDURE — 82274 ASSAY TEST FOR BLOOD FECAL: CPT | Performed by: NURSE PRACTITIONER

## 2020-07-14 LAB — HEMOCCULT STL QL IA: NEGATIVE

## 2020-11-29 ENCOUNTER — HEALTH MAINTENANCE LETTER (OUTPATIENT)
Age: 55
End: 2020-11-29

## 2021-01-15 ENCOUNTER — HEALTH MAINTENANCE LETTER (OUTPATIENT)
Age: 56
End: 2021-01-15

## 2021-01-20 DIAGNOSIS — E11.9 TYPE 2 DIABETES MELLITUS WITHOUT COMPLICATION, WITHOUT LONG-TERM CURRENT USE OF INSULIN (H): ICD-10-CM

## 2021-01-21 RX ORDER — LINAGLIPTIN 5 MG/1
TABLET, FILM COATED ORAL
Qty: 30 TABLET | Refills: 0 | Status: SHIPPED | OUTPATIENT
Start: 2021-01-21 | End: 2021-03-19

## 2021-01-21 NOTE — TELEPHONE ENCOUNTER
Medication is being filled for 1 time refill only due to:  Patient needs to be seen because Diabetes and labs. Please call and assist with an appointment. .

## 2021-03-15 ENCOUNTER — OFFICE VISIT (OUTPATIENT)
Dept: FAMILY MEDICINE | Facility: CLINIC | Age: 56
End: 2021-03-15
Payer: MEDICARE

## 2021-03-15 VITALS
HEIGHT: 63 IN | DIASTOLIC BLOOD PRESSURE: 86 MMHG | WEIGHT: 127 LBS | BODY MASS INDEX: 22.5 KG/M2 | HEART RATE: 89 BPM | RESPIRATION RATE: 15 BRPM | SYSTOLIC BLOOD PRESSURE: 128 MMHG | TEMPERATURE: 96.7 F | OXYGEN SATURATION: 93 %

## 2021-03-15 DIAGNOSIS — E11.9 TYPE 2 DIABETES MELLITUS WITHOUT COMPLICATION, WITHOUT LONG-TERM CURRENT USE OF INSULIN (H): ICD-10-CM

## 2021-03-15 DIAGNOSIS — Z12.11 COLON CANCER SCREENING: ICD-10-CM

## 2021-03-15 DIAGNOSIS — Z53.9 ERRONEOUS ENCOUNTER--DISREGARD: Primary | ICD-10-CM

## 2021-03-15 DIAGNOSIS — E06.9 THYROIDITIS: ICD-10-CM

## 2021-03-15 LAB — HBA1C MFR BLD: 11.8 % (ref 0–5.6)

## 2021-03-15 PROCEDURE — 36415 COLL VENOUS BLD VENIPUNCTURE: CPT | Performed by: NURSE PRACTITIONER

## 2021-03-15 PROCEDURE — 83036 HEMOGLOBIN GLYCOSYLATED A1C: CPT | Performed by: NURSE PRACTITIONER

## 2021-03-15 PROCEDURE — 84443 ASSAY THYROID STIM HORMONE: CPT | Performed by: NURSE PRACTITIONER

## 2021-03-15 PROCEDURE — 80053 COMPREHEN METABOLIC PANEL: CPT | Performed by: NURSE PRACTITIONER

## 2021-03-15 PROCEDURE — 84439 ASSAY OF FREE THYROXINE: CPT | Performed by: NURSE PRACTITIONER

## 2021-03-15 PROCEDURE — 80061 LIPID PANEL: CPT | Performed by: NURSE PRACTITIONER

## 2021-03-15 RX ORDER — ATORVASTATIN CALCIUM 20 MG/1
20 TABLET, FILM COATED ORAL DAILY
Qty: 90 TABLET | Refills: 3 | Status: CANCELLED | OUTPATIENT
Start: 2021-03-15

## 2021-03-15 ASSESSMENT — MIFFLIN-ST. JEOR: SCORE: 1310.16

## 2021-03-15 NOTE — PROGRESS NOTES
"    Janie Nicole is a 55 year old who presents for the following health issues     History of Present Illness       Diabetes:   He presents for follow up of diabetes.  He is checking home blood glucose a few times a week. He checks blood glucose before meals.  Blood glucose is sometimes over 200 and never under 70. When his blood glucose is low, the patient is asymptomatic for confusion, blurred vision, lethargy and reports not feeling dizzy, shaky, or weak.  He has no concerns regarding his diabetes at this time.  He is not experiencing numbness or burning in feet, excessive thirst, blurry vision, weight changes or redness, sores or blisters on feet. The patient has had a diabetic eye exam in the last 12 months.         He eats 0-1 servings of fruits and vegetables daily.He consumes 1 sweetened beverage(s) daily.He exercises with enough effort to increase his heart rate 30 to 60 minutes per day.  He exercises with enough effort to increase his heart rate 3 or less days per week.   [unfilled] is taking medications regularly.       Objective    /86 (BP Location: Right arm, Patient Position: Chair, Cuff Size: Adult Regular)   Pulse 89   Temp 96.7  F (35.9  C) (Tympanic)   Resp 15   Ht 1.607 m (5' 3.25\")   Wt 57.6 kg (127 lb)   SpO2 93%   BMI 22.32 kg/m    Body mass index is 22.32 kg/m .  Physical Exam       Left without being seen.    "

## 2021-03-16 ENCOUNTER — TELEPHONE (OUTPATIENT)
Dept: FAMILY MEDICINE | Facility: CLINIC | Age: 56
End: 2021-03-16

## 2021-03-16 DIAGNOSIS — E11.9 TYPE 2 DIABETES MELLITUS WITHOUT COMPLICATION, WITHOUT LONG-TERM CURRENT USE OF INSULIN (H): ICD-10-CM

## 2021-03-16 LAB
ALBUMIN SERPL-MCNC: 4.1 G/DL (ref 3.4–5)
ALP SERPL-CCNC: 43 U/L (ref 40–150)
ALT SERPL W P-5'-P-CCNC: 41 U/L (ref 0–70)
ANION GAP SERPL CALCULATED.3IONS-SCNC: 8 MMOL/L (ref 3–14)
AST SERPL W P-5'-P-CCNC: 15 U/L (ref 0–45)
BILIRUB SERPL-MCNC: 0.5 MG/DL (ref 0.2–1.3)
BUN SERPL-MCNC: 12 MG/DL (ref 7–30)
CALCIUM SERPL-MCNC: 9.2 MG/DL (ref 8.5–10.1)
CHLORIDE SERPL-SCNC: 94 MMOL/L (ref 94–109)
CHOLEST SERPL-MCNC: 230 MG/DL
CO2 SERPL-SCNC: 27 MMOL/L (ref 20–32)
CREAT SERPL-MCNC: 0.62 MG/DL (ref 0.66–1.25)
CREAT UR-MCNC: 58 MG/DL
GFR SERPL CREATININE-BSD FRML MDRD: >90 ML/MIN/{1.73_M2}
GLUCOSE SERPL-MCNC: 456 MG/DL (ref 70–99)
HDLC SERPL-MCNC: 105 MG/DL
LDLC SERPL CALC-MCNC: 104 MG/DL
MICROALBUMIN UR-MCNC: 7 MG/L
MICROALBUMIN/CREAT UR: 12.03 MG/G CR (ref 0–17)
NONHDLC SERPL-MCNC: 125 MG/DL
POTASSIUM SERPL-SCNC: 4.5 MMOL/L (ref 3.4–5.3)
PROT SERPL-MCNC: 7.1 G/DL (ref 6.8–8.8)
SODIUM SERPL-SCNC: 129 MMOL/L (ref 133–144)
T4 FREE SERPL-MCNC: 0.62 NG/DL (ref 0.76–1.46)
TRIGL SERPL-MCNC: 106 MG/DL
TSH SERPL DL<=0.005 MIU/L-ACNC: 100.64 MU/L (ref 0.4–4)

## 2021-03-16 PROCEDURE — 82043 UR ALBUMIN QUANTITATIVE: CPT | Performed by: NURSE PRACTITIONER

## 2021-03-16 NOTE — TELEPHONE ENCOUNTER
Called patient and he stated that he is still checking his schedule and he will call back tomorrow and get it all set up.

## 2021-03-16 NOTE — TELEPHONE ENCOUNTER
"As DOD, I was given the patient's lab results from yesterday by lab staff.    I placed a telephone call to the patient and had a brief conversation with him about his high blood sugar.  He states he had already received a [employer] message informing him that his blood sugar was high.  He is feeling fine today and he denies any symptoms of high blood sugar.  He states that before his labs were done, he \" ate a bunch of chocolate.\"  He has no face-to-face clinic appointment with the provider planned at this time.  I asked him to schedule a face-to-face appointment ASAP with any available provider.  He states right now he is driving and he will call the clinic back to schedule that appointment.    Reception--please call this patient later today, Tuesday, if he has not already scheduled a face-to-face appointment with any available provider.  "

## 2021-03-17 NOTE — TELEPHONE ENCOUNTER
LM to call back to schedule a face to face appointment ASAP per provider recommendation.    Courtney URBANO    [facility]

## 2021-03-21 ENCOUNTER — MYC MEDICAL ADVICE (OUTPATIENT)
Dept: FAMILY MEDICINE | Facility: CLINIC | Age: 56
End: 2021-03-21

## 2021-03-25 ENCOUNTER — OFFICE VISIT (OUTPATIENT)
Dept: FAMILY MEDICINE | Facility: CLINIC | Age: 56
End: 2021-03-25
Payer: MEDICARE

## 2021-03-25 VITALS
RESPIRATION RATE: 16 BRPM | OXYGEN SATURATION: 99 % | WEIGHT: 137 LBS | TEMPERATURE: 98.6 F | DIASTOLIC BLOOD PRESSURE: 82 MMHG | BODY MASS INDEX: 24.08 KG/M2 | SYSTOLIC BLOOD PRESSURE: 143 MMHG | HEART RATE: 82 BPM

## 2021-03-25 DIAGNOSIS — E11.9 TYPE 2 DIABETES MELLITUS WITHOUT COMPLICATION, WITHOUT LONG-TERM CURRENT USE OF INSULIN (H): Primary | ICD-10-CM

## 2021-03-25 DIAGNOSIS — E06.9 THYROIDITIS: ICD-10-CM

## 2021-03-25 DIAGNOSIS — E03.9 HYPOTHYROIDISM, UNSPECIFIED TYPE: ICD-10-CM

## 2021-03-25 PROCEDURE — 99213 OFFICE O/P EST LOW 20 MIN: CPT | Performed by: NURSE PRACTITIONER

## 2021-03-25 NOTE — PROGRESS NOTES
Assessment & Plan     Type 2 diabetes mellitus without complication, without long-term current use of insulin (H)  Refer to endocrine for further management of uncontrolled diabetes.    - ENDOCRINOLOGY ADULT REFERRAL    Thyroiditis    - ENDOCRINOLOGY ADULT REFERRAL    Hypothyroidism, unspecified type    - ENDOCRINOLOGY ADULT REFERRAL     Tobacco Cessation:   reports that he has never smoked. His smokeless tobacco use includes chew.        No follow-ups on file.    Edith Wood, ANITA CNP  [facility] FAVIAN Nicole is a 55 year old who presents for the following health issues   HPI     Pt is here for a med follow up   Hemoglobin A1C   Date Value Ref Range Status   03/15/2021 11.8 (H) 0 - 5.6 % Final     Comment:     Results confirmed by repeat test  Normal <5.7% Prediabetes 5.7-6.4%  Diabetes 6.5% or higher - adopted from ADA   consensus guidelines.     06/17/2020 12.4 (H) 0 - 5.6 % Final     Comment:     Normal <5.7% Prediabetes 5.7-6.4%  Diabetes 6.5% or higher - adopted from ADA   consensus guidelines.     01/21/2020 11.0 (H) 0 - 5.6 % Final     Comment:     Normal <5.7% Prediabetes 5.7-6.4%  Diabetes 6.5% or higher - adopted from ADA   consensus guidelines.       Has not been taking medications regularly.    ran out for a wile then got a refill og metformin and tradjenta but has not restarted this.    Not checking sugars.  Feeling well  Does not think he is having any lows.         Review of Systems   Constitutional, HEENT, cardiovascular, pulmonary, gi and gu systems are negative, except as otherwise noted.      Objective    BP (!) 143/82   Pulse 82   Temp 98.6  F (37  C) (Oral)   Resp 16   Wt 62.1 kg (137 lb)   SpO2 99%   BMI 24.08 kg/m    Body mass index is 24.08 kg/m .  Physical Exam   GENERAL: healthy, alert and no distress  MS: no gross musculoskeletal defects noted, no edema  SKIN: no suspicious lesions or rashes  Pt left prior to further physical exam.

## 2021-04-10 ENCOUNTER — HEALTH MAINTENANCE LETTER (OUTPATIENT)
Age: 56
End: 2021-04-10

## 2021-05-08 ENCOUNTER — IMMUNIZATION (OUTPATIENT)
Dept: LAB | Facility: CLINIC | Age: 56
End: 2021-05-08
Payer: MEDICARE

## 2021-06-02 DIAGNOSIS — E11.9 TYPE 2 DIABETES MELLITUS WITHOUT COMPLICATION, WITHOUT LONG-TERM CURRENT USE OF INSULIN (H): ICD-10-CM

## 2021-06-07 RX ORDER — LINAGLIPTIN 5 MG/1
TABLET, FILM COATED ORAL
Qty: 30 TABLET | Refills: 0 | Status: SHIPPED | OUTPATIENT
Start: 2021-06-07

## 2021-06-07 NOTE — TELEPHONE ENCOUNTER
"Routing refill request to provider for review/approval because:  --Based on order history patient does not appear to be taking this medication daily.    --Last visit:  3/25/2021 - \"Has not been taking medications regularly.    ran out for a wile then got a refill og metformin and tradjenta but has not restarted this.    Not checking sugars.  Feeling well  Does not think he is having any lows. \"     --Future Visit: NONE    --He is due for an a1c this month.     --Please let TC know if you want him in for office visit.        Hemoglobin A1C   Date Value Ref Range Status   03/15/2021 11.8 (H) 0 - 5.6 % Final     Comment:     Results confirmed by repeat test  Normal <5.7% Prediabetes 5.7-6.4%  Diabetes 6.5% or higher - adopted from ADA   consensus guidelines.     06/17/2020 12.4 (H) 0 - 5.6 % Final     Comment:     Normal <5.7% Prediabetes 5.7-6.4%  Diabetes 6.5% or higher - adopted from ADA   consensus guidelines.     01/21/2020 11.0 (H) 0 - 5.6 % Final     Comment:     Normal <5.7% Prediabetes 5.7-6.4%  Diabetes 6.5% or higher - adopted from ADA   consensus guidelines.           "

## 2021-06-22 DIAGNOSIS — E11.9 TYPE 2 DIABETES MELLITUS WITHOUT COMPLICATION, WITHOUT LONG-TERM CURRENT USE OF INSULIN (H): ICD-10-CM

## 2021-06-23 DIAGNOSIS — E11.9 TYPE 2 DIABETES MELLITUS WITHOUT COMPLICATION, WITHOUT LONG-TERM CURRENT USE OF INSULIN (H): ICD-10-CM

## 2021-06-23 DIAGNOSIS — E06.9 THYROIDITIS: ICD-10-CM

## 2021-06-23 RX ORDER — METFORMIN HCL 500 MG
TABLET, EXTENDED RELEASE 24 HR ORAL
Qty: 360 TABLET | Refills: 0 | Status: CANCELLED | OUTPATIENT
Start: 2021-06-23

## 2021-06-28 RX ORDER — METFORMIN HCL 500 MG
TABLET, EXTENDED RELEASE 24 HR ORAL
Qty: 360 TABLET | Refills: 0 | OUTPATIENT
Start: 2021-06-28

## 2021-06-28 RX ORDER — LEVOTHYROXINE SODIUM 175 UG/1
TABLET ORAL
Qty: 90 TABLET | Refills: 0 | OUTPATIENT
Start: 2021-06-28

## 2021-06-28 RX ORDER — LINAGLIPTIN 5 MG/1
TABLET, FILM COATED ORAL
Qty: 30 TABLET | Refills: 0 | OUTPATIENT
Start: 2021-06-28

## 2021-06-28 NOTE — TELEPHONE ENCOUNTER
Pt was referred to endocrinology but doesn't appear to have followed up. Please call he needs to be seen. Raiza Bowie RN    Diagnoses: Type 2 diabetes mellitus without complication, without long-term current use of insulin (H)   Thyroiditis   Hypothyroidism, unspecified type   Order: Endocrinology Adult Referral    ENDOCRINOLOGY NICK        Comment: Your provider has referred you to: [facility]:  [place] [facility]  [phone]  [email or web address]     Also the refill is being requested to a pharmacy in [place].

## 2021-06-28 NOTE — TELEPHONE ENCOUNTER
Note     Pt was referred to endocrinology but doesn't appear to have followed up. Please call he needs to be seen. Raiza Bowie RN     Diagnoses: Type 2 diabetes mellitus without complication, without long-term current use of insulin (H)   Thyroiditis   Hypothyroidism, unspecified type   Order: Endocrinology Adult Referral    ENDOCRINOLOGY NICK         Comment: Your provider has referred you to: [facility]:  [place] [facility]  [phone]  [email or web address]      Also the refill is being requested to a pharmacy in [place].

## 2021-09-25 ENCOUNTER — HEALTH MAINTENANCE LETTER (OUTPATIENT)
Age: 56
End: 2021-09-25

## 2022-05-01 ENCOUNTER — HEALTH MAINTENANCE LETTER (OUTPATIENT)
Age: 57
End: 2022-05-01

## 2022-06-26 ENCOUNTER — HEALTH MAINTENANCE LETTER (OUTPATIENT)
Age: 57
End: 2022-06-26

## 2022-12-26 ENCOUNTER — HEALTH MAINTENANCE LETTER (OUTPATIENT)
Age: 57
End: 2022-12-26

## 2023-01-01 ENCOUNTER — HEALTH MAINTENANCE LETTER (OUTPATIENT)
Age: 58
End: 2023-01-01